# Patient Record
Sex: MALE | Race: WHITE | NOT HISPANIC OR LATINO | Employment: FULL TIME | URBAN - METROPOLITAN AREA
[De-identification: names, ages, dates, MRNs, and addresses within clinical notes are randomized per-mention and may not be internally consistent; named-entity substitution may affect disease eponyms.]

---

## 2018-07-19 ENCOUNTER — OFFICE VISIT CONVERTED (OUTPATIENT)
Dept: FAMILY MEDICINE CLINIC | Facility: CLINIC | Age: 58
End: 2018-07-19
Attending: FAMILY MEDICINE

## 2018-11-20 ENCOUNTER — OFFICE VISIT CONVERTED (OUTPATIENT)
Dept: FAMILY MEDICINE CLINIC | Facility: CLINIC | Age: 58
End: 2018-11-20
Attending: FAMILY MEDICINE

## 2018-11-29 ENCOUNTER — OFFICE VISIT CONVERTED (OUTPATIENT)
Dept: FAMILY MEDICINE CLINIC | Facility: CLINIC | Age: 58
End: 2018-11-29
Attending: FAMILY MEDICINE

## 2018-12-20 ENCOUNTER — OFFICE VISIT CONVERTED (OUTPATIENT)
Dept: FAMILY MEDICINE CLINIC | Facility: CLINIC | Age: 58
End: 2018-12-20
Attending: FAMILY MEDICINE

## 2019-01-21 ENCOUNTER — OFFICE VISIT CONVERTED (OUTPATIENT)
Dept: FAMILY MEDICINE CLINIC | Facility: CLINIC | Age: 59
End: 2019-01-21
Attending: FAMILY MEDICINE

## 2019-03-14 ENCOUNTER — HOSPITAL ENCOUNTER (OUTPATIENT)
Dept: SLEEP MEDICINE | Facility: HOSPITAL | Age: 59
Discharge: HOME | End: 2019-03-14
Attending: INTERNAL MEDICINE

## 2019-03-20 ENCOUNTER — HOSPITAL ENCOUNTER (OUTPATIENT)
Dept: SLEEP MEDICINE | Facility: HOSPITAL | Age: 59
Discharge: HOME | End: 2019-03-20
Attending: INTERNAL MEDICINE

## 2019-06-06 ENCOUNTER — HOSPITAL ENCOUNTER (OUTPATIENT)
Dept: SLEEP MEDICINE | Facility: HOSPITAL | Age: 59
Discharge: HOME | End: 2019-06-06
Attending: INTERNAL MEDICINE

## 2019-06-25 ENCOUNTER — CONVERSION ENCOUNTER (OUTPATIENT)
Dept: GASTROENTEROLOGY | Facility: CLINIC | Age: 59
End: 2019-06-25
Attending: INTERNAL MEDICINE

## 2019-08-09 ENCOUNTER — HOSPITAL ENCOUNTER (OUTPATIENT)
Dept: GASTROENTEROLOGY | Facility: HOSPITAL | Age: 59
Setting detail: HOSPITAL OUTPATIENT SURGERY
Discharge: HOME | End: 2019-08-09
Attending: INTERNAL MEDICINE

## 2019-08-29 ENCOUNTER — HOSPITAL ENCOUNTER (OUTPATIENT)
Dept: OTHER | Facility: HOSPITAL | Age: 59
Discharge: HOME | End: 2019-08-29

## 2019-08-29 LAB
ALBUMIN SERPL-MCNC: 4.7 G/DL (ref 3.5–5)
ALBUMIN/GLOB SERPL: 2.1 {RATIO} (ref 1.4–2.6)
ALP SERPL-CCNC: 48 U/L (ref 56–155)
ALT SERPL-CCNC: 32 U/L (ref 10–40)
ANION GAP SERPL CALC-SCNC: 19 MMOL/L (ref 8–19)
AST SERPL-CCNC: 31 U/L (ref 15–50)
BASOPHILS # BLD AUTO: 0.04 10*3/UL (ref 0–0.2)
BASOPHILS NFR BLD AUTO: 0.8 % (ref 0–3)
BILIRUB SERPL-MCNC: 0.47 MG/DL (ref 0.2–1.3)
BUN SERPL-MCNC: 17 MG/DL (ref 5–25)
BUN/CREAT SERPL: 14 {RATIO} (ref 6–20)
CALCIUM SERPL-MCNC: 9.8 MG/DL (ref 8.7–10.4)
CHLORIDE SERPL-SCNC: 107 MMOL/L (ref 99–111)
CHOLEST SERPL-MCNC: 211 MG/DL (ref 107–200)
CHOLEST/HDLC SERPL: 4 {RATIO} (ref 3–6)
CONV ABS IMM GRAN: 0.01 10*3/UL (ref 0–0.2)
CONV CO2: 25 MMOL/L (ref 22–32)
CONV IMMATURE GRAN: 0.2 % (ref 0–1.8)
CONV TOTAL PROTEIN: 6.9 G/DL (ref 6.3–8.2)
CREAT UR-MCNC: 1.23 MG/DL (ref 0.7–1.2)
DEPRECATED RDW RBC AUTO: 42.5 FL (ref 35.1–43.9)
EOSINOPHIL # BLD AUTO: 0.09 10*3/UL (ref 0–0.7)
EOSINOPHIL # BLD AUTO: 1.9 % (ref 0–7)
ERYTHROCYTE [DISTWIDTH] IN BLOOD BY AUTOMATED COUNT: 13.1 % (ref 11.6–14.4)
GFR SERPLBLD BASED ON 1.73 SQ M-ARVRAT: >60 ML/MIN/{1.73_M2}
GLOBULIN UR ELPH-MCNC: 2.2 G/DL (ref 2–3.5)
GLUCOSE SERPL-MCNC: 90 MG/DL (ref 70–99)
HCT VFR BLD AUTO: 40 % (ref 42–52)
HDLC SERPL-MCNC: 53 MG/DL (ref 40–60)
HGB BLD-MCNC: 13.4 G/DL (ref 14–18)
LDLC SERPL CALC-MCNC: 129 MG/DL (ref 70–100)
LYMPHOCYTES # BLD AUTO: 1.59 10*3/UL (ref 1–5)
LYMPHOCYTES NFR BLD AUTO: 33.2 % (ref 20–45)
MCH RBC QN AUTO: 29.5 PG (ref 27–31)
MCHC RBC AUTO-ENTMCNC: 33.5 G/DL (ref 33–37)
MCV RBC AUTO: 87.9 FL (ref 80–96)
MONOCYTES # BLD AUTO: 0.4 10*3/UL (ref 0.2–1.2)
MONOCYTES NFR BLD AUTO: 8.4 % (ref 3–10)
NEUTROPHILS # BLD AUTO: 2.66 10*3/UL (ref 2–8)
NEUTROPHILS NFR BLD AUTO: 55.5 % (ref 30–85)
NRBC CBCN: 0 % (ref 0–0.7)
OSMOLALITY SERPL CALC.SUM OF ELEC: 303 MOSM/KG (ref 273–304)
PLATELET # BLD AUTO: 188 10*3/UL (ref 130–400)
PMV BLD AUTO: 8.8 FL (ref 9.4–12.4)
POTASSIUM SERPL-SCNC: 5.3 MMOL/L (ref 3.5–5.3)
RBC # BLD AUTO: 4.55 10*6/UL (ref 4.7–6.1)
SODIUM SERPL-SCNC: 146 MMOL/L (ref 135–147)
TRIGL SERPL-MCNC: 144 MG/DL (ref 40–150)
TSH SERPL-ACNC: 1.61 M[IU]/L (ref 0.27–4.2)
VLDLC SERPL-MCNC: 29 MG/DL (ref 5–37)
WBC # BLD AUTO: 4.79 10*3/UL (ref 4.8–10.8)

## 2019-09-26 ENCOUNTER — HOSPITAL ENCOUNTER (OUTPATIENT)
Dept: SLEEP MEDICINE | Facility: HOSPITAL | Age: 59
Discharge: HOME | End: 2019-09-26
Attending: INTERNAL MEDICINE

## 2019-11-25 ENCOUNTER — OFFICE VISIT CONVERTED (OUTPATIENT)
Dept: FAMILY MEDICINE CLINIC | Facility: CLINIC | Age: 59
End: 2019-11-25
Attending: FAMILY MEDICINE

## 2020-07-30 ENCOUNTER — HOSPITAL ENCOUNTER (OUTPATIENT)
Dept: SLEEP MEDICINE | Facility: HOSPITAL | Age: 60
Discharge: HOME | End: 2020-07-30
Attending: INTERNAL MEDICINE

## 2020-08-19 ENCOUNTER — HOSPITAL ENCOUNTER (OUTPATIENT)
Dept: OTHER | Facility: HOSPITAL | Age: 60
Discharge: HOME | End: 2020-08-19

## 2020-08-19 LAB
ALBUMIN SERPL-MCNC: 4.5 G/DL (ref 3.5–5)
ALBUMIN/GLOB SERPL: 1.9 {RATIO} (ref 1.4–2.6)
ALP SERPL-CCNC: 49 U/L (ref 56–155)
ALT SERPL-CCNC: 36 U/L (ref 10–40)
ANION GAP SERPL CALC-SCNC: 18 MMOL/L (ref 8–19)
AST SERPL-CCNC: 29 U/L (ref 15–50)
BASOPHILS # BLD AUTO: 0.04 10*3/UL (ref 0–0.2)
BASOPHILS NFR BLD AUTO: 0.7 % (ref 0–3)
BILIRUB SERPL-MCNC: 0.84 MG/DL (ref 0.2–1.3)
BUN SERPL-MCNC: 22 MG/DL (ref 5–25)
BUN/CREAT SERPL: 17 {RATIO} (ref 6–20)
CALCIUM SERPL-MCNC: 9.9 MG/DL (ref 8.7–10.4)
CHLORIDE SERPL-SCNC: 99 MMOL/L (ref 99–111)
CHOLEST SERPL-MCNC: 228 MG/DL (ref 107–200)
CHOLEST/HDLC SERPL: 5.1 {RATIO} (ref 3–6)
CONV ABS IMM GRAN: 0.01 10*3/UL (ref 0–0.2)
CONV CO2: 26 MMOL/L (ref 22–32)
CONV IMMATURE GRAN: 0.2 % (ref 0–1.8)
CONV TOTAL PROTEIN: 6.9 G/DL (ref 6.3–8.2)
CREAT UR-MCNC: 1.31 MG/DL (ref 0.7–1.2)
DEPRECATED RDW RBC AUTO: 38.9 FL (ref 35.1–43.9)
EOSINOPHIL # BLD AUTO: 0.09 10*3/UL (ref 0–0.7)
EOSINOPHIL # BLD AUTO: 1.6 % (ref 0–7)
ERYTHROCYTE [DISTWIDTH] IN BLOOD BY AUTOMATED COUNT: 12.3 % (ref 11.6–14.4)
GFR SERPLBLD BASED ON 1.73 SQ M-ARVRAT: 58 ML/MIN/{1.73_M2}
GLOBULIN UR ELPH-MCNC: 2.4 G/DL (ref 2–3.5)
GLUCOSE SERPL-MCNC: 94 MG/DL (ref 70–99)
HCT VFR BLD AUTO: 41.1 % (ref 42–52)
HDLC SERPL-MCNC: 45 MG/DL (ref 40–60)
HGB BLD-MCNC: 13.9 G/DL (ref 14–18)
LDLC SERPL CALC-MCNC: 138 MG/DL (ref 70–100)
LYMPHOCYTES # BLD AUTO: 1.86 10*3/UL (ref 1–5)
LYMPHOCYTES NFR BLD AUTO: 33.8 % (ref 20–45)
MCH RBC QN AUTO: 29.3 PG (ref 27–31)
MCHC RBC AUTO-ENTMCNC: 33.8 G/DL (ref 33–37)
MCV RBC AUTO: 86.5 FL (ref 80–96)
MONOCYTES # BLD AUTO: 0.47 10*3/UL (ref 0.2–1.2)
MONOCYTES NFR BLD AUTO: 8.5 % (ref 3–10)
NEUTROPHILS # BLD AUTO: 3.03 10*3/UL (ref 2–8)
NEUTROPHILS NFR BLD AUTO: 55.2 % (ref 30–85)
NRBC CBCN: 0 % (ref 0–0.7)
OSMOLALITY SERPL CALC.SUM OF ELEC: 291 MOSM/KG (ref 273–304)
PLATELET # BLD AUTO: 178 10*3/UL (ref 130–400)
PMV BLD AUTO: 9.1 FL (ref 9.4–12.4)
POTASSIUM SERPL-SCNC: 3.9 MMOL/L (ref 3.5–5.3)
RBC # BLD AUTO: 4.75 10*6/UL (ref 4.7–6.1)
SODIUM SERPL-SCNC: 139 MMOL/L (ref 135–147)
TRIGL SERPL-MCNC: 224 MG/DL (ref 40–150)
TSH SERPL-ACNC: 1.57 M[IU]/L (ref 0.27–4.2)
VLDLC SERPL-MCNC: 45 MG/DL (ref 5–37)
WBC # BLD AUTO: 5.5 10*3/UL (ref 4.8–10.8)

## 2020-12-17 ENCOUNTER — TELEMEDICINE CONVERTED (OUTPATIENT)
Dept: FAMILY MEDICINE CLINIC | Facility: CLINIC | Age: 60
End: 2020-12-17
Attending: FAMILY MEDICINE

## 2020-12-23 ENCOUNTER — HOSPITAL ENCOUNTER (OUTPATIENT)
Dept: OTHER | Facility: HOSPITAL | Age: 60
Discharge: HOME | End: 2020-12-23
Attending: INTERNAL MEDICINE

## 2021-01-16 ENCOUNTER — HOSPITAL ENCOUNTER (OUTPATIENT)
Dept: OTHER | Facility: HOSPITAL | Age: 61
Discharge: HOME | End: 2021-01-16
Attending: INTERNAL MEDICINE

## 2021-05-14 NOTE — PROGRESS NOTES
Progress Note      Patient Name: Babatunde Barlow   Patient ID: [id number]   Sex: Male   YOB: 1960    Primary Care Provider: Fam Gutierrez DO   Referring Provider: Fam Gutierrez DO    Visit Date: December 17, 2020    Provider: Fam Gutierrez DO   Location: [facility]   Location Address: [address] [place] [address]  [id number]   Location Phone: [phone]          Chief Complaint  · Follow up - HLD, HTN, Snoring  · medication refill ( 90 day supply )       History Of Present Illness  Video Conferencing Visit  Babatunde Barlow is a 60 year old /White male who is presenting for evaluation via video conferencing via ZOOM. Verbal consent obtained before beginning visit.   The following staff were present during this visit: Sil Pryor CMA   Babatunde Barlow is a 60 year old /White male who presents for evaluation and treatment of: HTN and HLD. He states that he has been checking his BP and it has been good. He had annual employee labs in August. These were good, except for mildly elevated Cr.      He has been Dx with VERNA and wears a C-PAP nightly       Past Medical History  Disease Name Date Onset Notes   Edema --  --    Hyperlipidemia --  --    Hypertension --  --    Lumbar radiculopathy 11/29/2018 --    Seborrheic keratoses 11/25/2019 --          Past Surgical History  Procedure Name Date Notes   Colonoscopy 2018 2019 Ana, History of Colon Polyps   shoulder repair --  --          Medication List  Name Date Started Instructions   hydrochlorothiazide 25 mg oral tablet 09/05/2020 TAKE 1 TABLET BY MOUTH DAILY   pravastatin 40 mg oral tablet 01/06/2020 TAKE 1 TABLET BY MOUTH EVERY NIGHT AT BEDTIME         Allergy List  Allergen Name Date Reaction Notes   NO KNOWN DRUG ALLERGIES --  --  --          Family Medical History  Disease Name Relative/Age Notes   Stroke  --    Hypertension  --    Cancer, Unspecified  --    - No  "Family History of Colorectal Cancer  --    Diabetes  --          Social History  Finding Status Start/Stop Quantity Notes   Alcohol Current every day --/-- --  01/21/2019 - 11/29/2018 - 11/20/2018 -    Tobacco Never --/-- --  never         Immunizations  NameDate Admin Mfg Trade Name Lot Number Route Inj VIS Given VIS Publication   Xlsniofet91/01/2020 SKB Fluarix, quadrivalent, preservative free [id number] NE NE 11/01/2020    Comments: [facility]         Review of Systems  · Constitutional  o Denies  o : fatigue  · Eyes  o Denies  o : changes in vision  · HENT  o Denies  o : headaches  · Cardiovascular  o Denies  o : chest pain, irregular heart beats, rapid heart rate  · Respiratory  o Denies  o : shortness of breath, wheezing, cough, dyspnea on exertion      Vitals  Date Time BP Position Site L\R Cuff Size HR RR TEMP (F) WT  HT  BMI kg/m2 BSA m2 O2 Sat FR L/min FiO2 HC       01/21/2019 08:45 /81 Sitting    55 - R  97.7 209lbs 8oz 5'  11\" 29.22 2.18 100 %      11/25/2019 09:32 /79 Sitting    56 - R   214lbs 16oz 5'  11\" 29.99 2.21 99 %  21%          Physical Examination  · Constitutional  o Appearance  o : well-nourished, well developed, no obvious deformities present  · Head and Face  o Head  o :   § Inspection  § : atraumatic, normocephalic  o Face  o :   § Inspection  § : no facial lesions  · Respiratory  o Respiratory Effort  o : breathing unlabored, no accessory muscle use  · Neurologic  o Mental Status Examination  o :   § Orientation  § : grossly oriented to person, place and time  § Speech/Language  § : speech development normal for age, level of language comprehension normal for age  · Psychiatric  o Judgement and Insight  o : judgement and insight intact, judgement for everyday activities and social situations within normal limits  o Thought Processes  o : rate of thoughts normal, thought content logical, abstract reasoning within normal limits  o Mood and Affect  o : mood normal, affect " appropriate          Assessment  · Hyperlipidemia     272.4/E78.5  stable  · Hypertension     401.9/I10  He is doing well Will switch back to Lisinopril His renal function was mildly elevated, probably due to fasting. Will recheck   · VERNA on CPAP       Obstructive sleep apnea (adult) (pediatric)     327.23/G47.33  Dependence on other enabling machines and devices     327.23/Z99.89  f/u with Sleep Clinic      Plan  · Orders  o BMP [facility] ([id number]) - 401.9/I10 - 12/17/2020  o ACO-39: Current medications updated and reviewed (, 1159F) - - 12/17/2020  o ACO-14: Influenza immunization administered or previously received [facility] () - - 12/17/2020  · Medications  o lisinopril 40 mg oral tablet   SIG: take 1 tablet (40 mg) by oral route once daily for 90 days   DISP: (90) Tablet with 3 refills  Prescribed on 12/17/2020     o pravastatin 40 mg oral tablet   SIG: take 1 tablet (40 mg) by oral route once daily for 90 days   DISP: (90) Tablet with 3 refills  Adjusted on 12/17/2020     o hydrochlorothiazide 25 mg oral tablet   SIG: TAKE 1 TABLET BY MOUTH DAILY   DISP: (90) Tablet with -1 refills  Discontinued on 12/17/2020     o Medications have been Reconciled  o Transition of Care or Provider Policy  · Instructions  o Patient was educated and given low cholesterol diet information.  o Recommended exercise program to assist with cholesterol, weight loss and overall health improvement.  o Advised that cheeses and other sources of dairy fats, animal fats, fast food, and the extras (candy, pastries, pies, doughnuts and cookies) all contain LDL raising nutrients. Advised to increase fruits, vegetables, whole grains, and to monitor portion sizes.   o Patient is taking medications as prescribed and doing well.   o Take all medications as prescribed/directed.  o Patient instructed/educated on their diet and exercise program.  o Patient was educated/instructed on their diagnosis, treatment and medications prior to discharge from the  clinic today.  o Patient instructed to seek medical attention urgently for new or worsening symptoms.  o Call the office with any concerns or questions.  o Bring all medicines with their bottles to each office visit.  · Disposition  o Call or Return if symptoms worsen or persist.  o Return Visit Request in/on 1 year +/- 2 days ([id number]).            Electronically Signed by: Fam Gutierrez, DO -Author on December 17, 2020 08:57:14 AM

## 2021-05-15 VITALS
HEIGHT: 71 IN | HEART RATE: 58 BPM | SYSTOLIC BLOOD PRESSURE: 127 MMHG | DIASTOLIC BLOOD PRESSURE: 79 MMHG | BODY MASS INDEX: 30.1 KG/M2 | WEIGHT: 215 LBS | OXYGEN SATURATION: 99 %

## 2021-05-15 VITALS
HEART RATE: 70 BPM | WEIGHT: 209.25 LBS | TEMPERATURE: 98.2 F | OXYGEN SATURATION: 98 % | BODY MASS INDEX: 29.29 KG/M2 | HEIGHT: 71 IN | RESPIRATION RATE: 12 BRPM | DIASTOLIC BLOOD PRESSURE: 73 MMHG | SYSTOLIC BLOOD PRESSURE: 108 MMHG

## 2021-05-15 VITALS
HEART RATE: 57 BPM | HEIGHT: 71 IN | WEIGHT: 209.5 LBS | SYSTOLIC BLOOD PRESSURE: 122 MMHG | BODY MASS INDEX: 29.33 KG/M2 | OXYGEN SATURATION: 100 % | TEMPERATURE: 97.7 F | DIASTOLIC BLOOD PRESSURE: 81 MMHG

## 2021-05-16 VITALS
BODY MASS INDEX: 29.56 KG/M2 | TEMPERATURE: 98.1 F | HEIGHT: 71 IN | OXYGEN SATURATION: 99 % | DIASTOLIC BLOOD PRESSURE: 75 MMHG | HEART RATE: 61 BPM | SYSTOLIC BLOOD PRESSURE: 151 MMHG | WEIGHT: 211.12 LBS

## 2021-05-16 VITALS
HEIGHT: 71 IN | WEIGHT: 223 LBS | SYSTOLIC BLOOD PRESSURE: 140 MMHG | HEART RATE: 45 BPM | DIASTOLIC BLOOD PRESSURE: 62 MMHG | BODY MASS INDEX: 31.22 KG/M2

## 2021-05-16 VITALS
HEIGHT: 71 IN | OXYGEN SATURATION: 98 % | BODY MASS INDEX: 29.33 KG/M2 | WEIGHT: 209.5 LBS | TEMPERATURE: 98.4 F | DIASTOLIC BLOOD PRESSURE: 76 MMHG | SYSTOLIC BLOOD PRESSURE: 143 MMHG | HEART RATE: 74 BPM

## 2021-11-22 ENCOUNTER — IMMUNIZATION (OUTPATIENT)
Dept: VACCINE CLINIC | Facility: HOSPITAL | Age: 61
End: 2021-11-22

## 2021-11-22 DIAGNOSIS — Z23 NEED FOR VACCINATION: Primary | ICD-10-CM

## 2021-12-20 ENCOUNTER — OFFICE VISIT (OUTPATIENT)
Dept: FAMILY MEDICINE CLINIC | Facility: CLINIC | Age: 61
End: 2021-12-20

## 2021-12-20 VITALS
WEIGHT: 225.4 LBS | BODY MASS INDEX: 31.56 KG/M2 | OXYGEN SATURATION: 100 % | TEMPERATURE: 97.3 F | HEART RATE: 80 BPM | SYSTOLIC BLOOD PRESSURE: 160 MMHG | DIASTOLIC BLOOD PRESSURE: 80 MMHG | HEIGHT: 71 IN

## 2021-12-20 DIAGNOSIS — G47.33 OSA ON CPAP: Primary | ICD-10-CM

## 2021-12-20 DIAGNOSIS — Z99.89 OSA ON CPAP: Primary | ICD-10-CM

## 2021-12-20 DIAGNOSIS — Z00.00 ANNUAL PHYSICAL EXAM: ICD-10-CM

## 2021-12-20 DIAGNOSIS — I10 PRIMARY HYPERTENSION: ICD-10-CM

## 2021-12-20 DIAGNOSIS — E78.5 HYPERLIPIDEMIA, UNSPECIFIED HYPERLIPIDEMIA TYPE: ICD-10-CM

## 2021-12-20 LAB
ALBUMIN SERPL-MCNC: 4.4 G/DL (ref 3.5–5.2)
ALBUMIN/GLOB SERPL: 2 G/DL
ALP SERPL-CCNC: 54 U/L (ref 39–117)
ALT SERPL W P-5'-P-CCNC: 31 U/L (ref 1–41)
ANION GAP SERPL CALCULATED.3IONS-SCNC: 9.9 MMOL/L (ref 5–15)
AST SERPL-CCNC: 25 U/L (ref 1–40)
BILIRUB SERPL-MCNC: 0.5 MG/DL (ref 0–1.2)
BUN SERPL-MCNC: 21 MG/DL (ref 8–23)
BUN/CREAT SERPL: 17.9 (ref 7–25)
CALCIUM SPEC-SCNC: 9.2 MG/DL (ref 8.6–10.5)
CHLORIDE SERPL-SCNC: 109 MMOL/L (ref 98–107)
CHOLEST SERPL-MCNC: 196 MG/DL (ref 0–200)
CO2 SERPL-SCNC: 26.1 MMOL/L (ref 22–29)
CREAT SERPL-MCNC: 1.17 MG/DL (ref 0.76–1.27)
GFR SERPL CREATININE-BSD FRML MDRD: 63 ML/MIN/1.73
GLOBULIN UR ELPH-MCNC: 2.2 GM/DL
GLUCOSE SERPL-MCNC: 99 MG/DL (ref 65–99)
HDLC SERPL-MCNC: 59 MG/DL (ref 40–60)
LDLC SERPL CALC-MCNC: 109 MG/DL (ref 0–100)
LDLC/HDLC SERPL: 1.78 {RATIO}
POTASSIUM SERPL-SCNC: 4.1 MMOL/L (ref 3.5–5.2)
PROT SERPL-MCNC: 6.6 G/DL (ref 6–8.5)
SODIUM SERPL-SCNC: 145 MMOL/L (ref 136–145)
TRIGL SERPL-MCNC: 159 MG/DL (ref 0–150)
VLDLC SERPL-MCNC: 28 MG/DL (ref 5–40)

## 2021-12-20 PROCEDURE — 80061 LIPID PANEL: CPT | Performed by: FAMILY MEDICINE

## 2021-12-20 PROCEDURE — 36415 COLL VENOUS BLD VENIPUNCTURE: CPT | Performed by: FAMILY MEDICINE

## 2021-12-20 PROCEDURE — 99396 PREV VISIT EST AGE 40-64: CPT | Performed by: FAMILY MEDICINE

## 2021-12-20 PROCEDURE — 80053 COMPREHEN METABOLIC PANEL: CPT | Performed by: FAMILY MEDICINE

## 2021-12-20 RX ORDER — HYDROCHLOROTHIAZIDE 25 MG/1
25 TABLET ORAL DAILY
Qty: 90 TABLET | Refills: 3 | Status: SHIPPED | OUTPATIENT
Start: 2021-12-20 | End: 2022-12-20 | Stop reason: SDUPTHER

## 2021-12-20 RX ORDER — PRAVASTATIN SODIUM 40 MG
40 TABLET ORAL DAILY
Qty: 90 TABLET | Refills: 3 | Status: SHIPPED | OUTPATIENT
Start: 2021-12-20 | End: 2022-12-20 | Stop reason: SDUPTHER

## 2021-12-20 RX ORDER — LISINOPRIL 40 MG/1
40 TABLET ORAL DAILY
Qty: 90 TABLET | Refills: 3 | Status: SHIPPED | OUTPATIENT
Start: 2021-12-20 | End: 2022-12-20 | Stop reason: SDUPTHER

## 2021-12-20 NOTE — PROGRESS NOTES
Chief Complaint   Patient presents with   • Annual Exam     no concerns at this time        Subjective     Babatunde Barlow  has a past medical history of Edema, Hyperlipidemia, Hypertension, Lumbar radiculopathy (11/29/2018), VERNA on CPAP (12/17/2020), and Seborrheic keratoses (11/25/2019).    Annual exam-he states otherwise he is doing well.  He has no other additional specific concerns.    Hypertension-he states he has not been checking his blood pressure outside the office.  He is taking his medication.    Hyperlipidemia-he takes his pravastatin on a nightly basis.    VERNA-he states he wears his CPAP on a nightly basis.      PHQ-2 Depression Screening  Little interest or pleasure in doing things? 0   Feeling down, depressed, or hopeless? 0   PHQ-2 Total Score 0   PHQ-9 Depression Screening  Little interest or pleasure in doing things? 0   Feeling down, depressed, or hopeless? 0   Trouble falling or staying asleep, or sleeping too much?     Feeling tired or having little energy?     Poor appetite or overeating?     Feeling bad about yourself - or that you are a failure or have let yourself or your family down?     Trouble concentrating on things, such as reading the newspaper or watching television?     Moving or speaking so slowly that other people could have noticed? Or the opposite - being so fidgety or restless that you have been moving around a lot more than usual?     Thoughts that you would be better off dead, or of hurting yourself in some way?     PHQ-9 Total Score 0   If you checked off any problems, how difficult have these problems made it for you to do your work, take care of things at home, or get along with other people?       No Known Allergies    Prior to Admission medications    Medication Sig Start Date End Date Taking? Authorizing Provider   lisinopril (PRINIVIL,ZESTRIL) 40 MG tablet Take 1 tablet by mouth Daily. 12/17/20  Yes Fam Gutierrez, DO   pravastatin (PRAVACHOL) 40 MG tablet  Take 1 tablet by mouth Daily. 12/17/20  Yes Fam Gutierrez,    hydrochlorothiazide (HYDRODIURIL) 25 MG tablet Take 25 mg by mouth Daily. 11/13/18 12/20/21  Emergency, Nurse Lexii, RN   HYDROcodone-acetaminophen (NORCO) 5-325 MG per tablet Take 1 tablet by mouth Every 6 (Six) Hours As Needed for Moderate Pain  or Severe Pain . 11/23/18 12/20/21  Gideon Meraz MD   lisinopril (PRINIVIL,ZESTRIL) 40 MG tablet  11/9/18 12/20/21  Emergency, Nurse Lexii, RN   pravastatin (PRAVACHOL) 40 MG tablet  11/9/18 12/20/21  Emergency, Nurse Epic, RN        Patient Active Problem List   Diagnosis   • Hyperlipidemia   • Hypertension   • VERNA on CPAP   • Annual physical exam        Past Surgical History:   Procedure Laterality Date   • COLONOSCOPY      2018,2019;SUSY,HISTORY OF COLON POLYPS   • SHOULDER SURGERY      SHOULDER REPAIR       Social History     Socioeconomic History   • Marital status:    Tobacco Use   • Smoking status: Never Smoker   • Smokeless tobacco: Never Used   Vaping Use   • Vaping Use: Never used   Substance and Sexual Activity   • Alcohol use: Defer       Family History   Problem Relation Age of Onset   • Stroke Other    • Hypertension Other    • Cancer Other         UNSPECIFIED   • Colon cancer Other         NO FAMILY HISTORY OF COLORECTAL CANCER   • Diabetes Other        Family history, surgical history, past medical history, Allergies and med's reviewed with patient today and updated in [facility] EMR.     ROS:  Review of Systems   Constitutional: Negative for fatigue.   HENT: Negative for congestion, postnasal drip and rhinorrhea.    Eyes: Negative for blurred vision and visual disturbance.   Respiratory: Negative for cough, chest tightness, shortness of breath and wheezing.    Cardiovascular: Negative for chest pain and palpitations.   Gastrointestinal: Negative for abdominal pain, constipation and diarrhea.   Neurological: Negative for headache.   Hematological: Negative for adenopathy.  "  Psychiatric/Behavioral: Negative for depressed mood. The patient is not nervous/anxious.        OBJECTIVE:  Vitals:    12/20/21 0824   BP: 160/80   BP Location: Left arm   Patient Position: Sitting   Cuff Size: Large Adult   Pulse: 80   Temp: 97.3 °F (36.3 °C)   TempSrc: Temporal   SpO2: 100%   Weight: 102 kg (225 lb 6.4 oz)   Height: 180.3 cm (71\")     No exam data present   Body mass index is 31.44 kg/m².  No LMP for male patient.    Physical Exam  Vitals and nursing note reviewed.   Constitutional:       General: He is not in acute distress.     Appearance: Normal appearance. He is obese.   HENT:      Head: Normocephalic.      Right Ear: Tympanic membrane, ear canal and external ear normal.      Left Ear: Tympanic membrane, ear canal and external ear normal.      Nose: Nose normal.      Mouth/Throat:      Mouth: Mucous membranes are moist.      Pharynx: Oropharynx is clear.   Eyes:      General: No scleral icterus.     Conjunctiva/sclera: Conjunctivae normal.      Pupils: Pupils are equal, round, and reactive to light.   Cardiovascular:      Rate and Rhythm: Normal rate and regular rhythm.      Pulses: Normal pulses.      Heart sounds: Normal heart sounds. No murmur heard.      Pulmonary:      Effort: Pulmonary effort is normal.      Breath sounds: Normal breath sounds. No wheezing, rhonchi or rales.   Abdominal:      General: Abdomen is flat. Bowel sounds are normal.      Palpations: Abdomen is soft. There is no mass.      Tenderness: There is no abdominal tenderness. There is no right CVA tenderness.   Musculoskeletal:      Cervical back: No rigidity or tenderness.   Lymphadenopathy:      Cervical: No cervical adenopathy.   Skin:     General: Skin is warm and dry.      Coloration: Skin is not jaundiced.      Findings: No rash.   Neurological:      General: No focal deficit present.      Mental Status: He is alert and oriented to person, place, and time.      Gait: Gait normal.   Psychiatric:         Mood and " Affect: Mood normal.         Thought Content: Thought content normal.         Judgment: Judgment normal.         Procedures    No visits with results within 30 Day(s) from this visit.   Latest known visit with results is:   No results found for any previous visit.       ASSESSMENT/ PLAN:    Diagnoses and all orders for this visit:    1. VERNA on CPAP (Primary)  Assessment & Plan:  He wears his CPAP on a regular basis.      2. Primary hypertension  Assessment & Plan:  His blood pressures up a little bit here today we will add some hydrochlorothiazide back to his regiment.  In the meantime he will keep working on some lifestyle changes    Orders:  -     Comprehensive Metabolic Panel  -     Lipid Panel    3. Hyperlipidemia, unspecified hyperlipidemia type  Assessment & Plan:  We will update his lipid profile with today's labs.    Orders:  -     Comprehensive Metabolic Panel  -     Lipid Panel    4. Annual physical exam  Assessment & Plan:  Overall he is doing well.  He has gained some weight over the past year and thus his blood pressure is up.      Other orders  -     hydroCHLOROthiazide (HYDRODIURIL) 25 MG tablet; Take 1 tablet by mouth Daily for 90 days.  Dispense: 90 tablet; Refill: 3  -     pravastatin (PRAVACHOL) 40 MG tablet; Take 1 tablet by mouth Daily.  Dispense: 90 tablet; Refill: 3  -     lisinopril (PRINIVIL,ZESTRIL) 40 MG tablet; Take 1 tablet by mouth Daily.  Dispense: 90 tablet; Refill: 3      Orders Placed Today:     New Medications Ordered This Visit   Medications   • hydroCHLOROthiazide (HYDRODIURIL) 25 MG tablet     Sig: Take 1 tablet by mouth Daily for 90 days.     Dispense:  90 tablet     Refill:  3   • pravastatin (PRAVACHOL) 40 MG tablet     Sig: Take 1 tablet by mouth Daily.     Dispense:  90 tablet     Refill:  3   • lisinopril (PRINIVIL,ZESTRIL) 40 MG tablet     Sig: Take 1 tablet by mouth Daily.     Dispense:  90 tablet     Refill:  3        Management Plan:     An After Visit Summary was  printed and given to the patient at discharge.    Follow-up: No follow-ups on file.    Fam Gutierrez DO 12/20/2021 08:42 EST  This note was electronically signed.

## 2021-12-20 NOTE — ASSESSMENT & PLAN NOTE
His blood pressures up a little bit here today we will add some hydrochlorothiazide back to his regiment.  In the meantime he will keep working on some lifestyle changes

## 2021-12-20 NOTE — ASSESSMENT & PLAN NOTE
Overall he is doing well.  He has gained some weight over the past year and thus his blood pressure is up.

## 2022-12-20 ENCOUNTER — OFFICE VISIT (OUTPATIENT)
Dept: FAMILY MEDICINE CLINIC | Facility: CLINIC | Age: 62
End: 2022-12-20

## 2022-12-20 VITALS
OXYGEN SATURATION: 100 % | SYSTOLIC BLOOD PRESSURE: 137 MMHG | DIASTOLIC BLOOD PRESSURE: 80 MMHG | HEART RATE: 69 BPM | BODY MASS INDEX: 28.7 KG/M2 | TEMPERATURE: 97 F | HEIGHT: 71 IN | WEIGHT: 205 LBS

## 2022-12-20 DIAGNOSIS — Z00.00 ANNUAL PHYSICAL EXAM: ICD-10-CM

## 2022-12-20 DIAGNOSIS — E78.5 HYPERLIPIDEMIA, UNSPECIFIED HYPERLIPIDEMIA TYPE: Primary | ICD-10-CM

## 2022-12-20 DIAGNOSIS — Z12.5 SCREENING FOR MALIGNANT NEOPLASM OF PROSTATE: ICD-10-CM

## 2022-12-20 DIAGNOSIS — I10 PRIMARY HYPERTENSION: ICD-10-CM

## 2022-12-20 PROBLEM — F51.01 PRIMARY INSOMNIA: Status: ACTIVE | Noted: 2022-12-20

## 2022-12-20 LAB
ALBUMIN SERPL-MCNC: 4.6 G/DL (ref 3.5–5.2)
ALBUMIN/GLOB SERPL: 2.4 G/DL
ALP SERPL-CCNC: 44 U/L (ref 39–117)
ALT SERPL W P-5'-P-CCNC: 22 U/L (ref 1–41)
ANION GAP SERPL CALCULATED.3IONS-SCNC: 8.1 MMOL/L (ref 5–15)
AST SERPL-CCNC: 27 U/L (ref 1–40)
BASOPHILS # BLD AUTO: 0.03 10*3/MM3 (ref 0–0.2)
BASOPHILS NFR BLD AUTO: 0.5 % (ref 0–1.5)
BILIRUB SERPL-MCNC: 0.5 MG/DL (ref 0–1.2)
BUN SERPL-MCNC: 20 MG/DL (ref 8–23)
BUN/CREAT SERPL: 15.7 (ref 7–25)
CALCIUM SPEC-SCNC: 9.4 MG/DL (ref 8.6–10.5)
CHLORIDE SERPL-SCNC: 106 MMOL/L (ref 98–107)
CHOLEST SERPL-MCNC: 181 MG/DL (ref 0–200)
CO2 SERPL-SCNC: 25.9 MMOL/L (ref 22–29)
CREAT SERPL-MCNC: 1.27 MG/DL (ref 0.76–1.27)
DEPRECATED RDW RBC AUTO: 41.2 FL (ref 37–54)
EGFRCR SERPLBLD CKD-EPI 2021: 63.1 ML/MIN/1.73
EOSINOPHIL # BLD AUTO: 0.06 10*3/MM3 (ref 0–0.4)
EOSINOPHIL NFR BLD AUTO: 1 % (ref 0.3–6.2)
ERYTHROCYTE [DISTWIDTH] IN BLOOD BY AUTOMATED COUNT: 13 % (ref 12.3–15.4)
GLOBULIN UR ELPH-MCNC: 1.9 GM/DL
GLUCOSE SERPL-MCNC: 100 MG/DL (ref 65–99)
HCT VFR BLD AUTO: 36.1 % (ref 37.5–51)
HDLC SERPL-MCNC: 50 MG/DL (ref 40–60)
HGB BLD-MCNC: 12.3 G/DL (ref 13–17.7)
IMM GRANULOCYTES # BLD AUTO: 0.02 10*3/MM3 (ref 0–0.05)
IMM GRANULOCYTES NFR BLD AUTO: 0.3 % (ref 0–0.5)
LDLC SERPL CALC-MCNC: 117 MG/DL (ref 0–100)
LDLC/HDLC SERPL: 2.32 {RATIO}
LYMPHOCYTES # BLD AUTO: 1.57 10*3/MM3 (ref 0.7–3.1)
LYMPHOCYTES NFR BLD AUTO: 27.2 % (ref 19.6–45.3)
MCH RBC QN AUTO: 29.5 PG (ref 26.6–33)
MCHC RBC AUTO-ENTMCNC: 34.1 G/DL (ref 31.5–35.7)
MCV RBC AUTO: 86.6 FL (ref 79–97)
MONOCYTES # BLD AUTO: 0.39 10*3/MM3 (ref 0.1–0.9)
MONOCYTES NFR BLD AUTO: 6.7 % (ref 5–12)
NEUTROPHILS NFR BLD AUTO: 3.71 10*3/MM3 (ref 1.7–7)
NEUTROPHILS NFR BLD AUTO: 64.3 % (ref 42.7–76)
NRBC BLD AUTO-RTO: 0 /100 WBC (ref 0–0.2)
PLATELET # BLD AUTO: 197 10*3/MM3 (ref 140–450)
PMV BLD AUTO: 10.3 FL (ref 6–12)
POTASSIUM SERPL-SCNC: 4.6 MMOL/L (ref 3.5–5.2)
PROT SERPL-MCNC: 6.5 G/DL (ref 6–8.5)
PSA SERPL-MCNC: 1.03 NG/ML (ref 0–4)
RBC # BLD AUTO: 4.17 10*6/MM3 (ref 4.14–5.8)
SODIUM SERPL-SCNC: 140 MMOL/L (ref 136–145)
TRIGL SERPL-MCNC: 75 MG/DL (ref 0–150)
TSH SERPL DL<=0.05 MIU/L-ACNC: 1.75 UIU/ML (ref 0.27–4.2)
VLDLC SERPL-MCNC: 14 MG/DL (ref 5–40)
WBC NRBC COR # BLD: 5.78 10*3/MM3 (ref 3.4–10.8)

## 2022-12-20 PROCEDURE — G0103 PSA SCREENING: HCPCS | Performed by: FAMILY MEDICINE

## 2022-12-20 PROCEDURE — 80050 GENERAL HEALTH PANEL: CPT | Performed by: FAMILY MEDICINE

## 2022-12-20 PROCEDURE — 36415 COLL VENOUS BLD VENIPUNCTURE: CPT | Performed by: FAMILY MEDICINE

## 2022-12-20 PROCEDURE — 99396 PREV VISIT EST AGE 40-64: CPT | Performed by: FAMILY MEDICINE

## 2022-12-20 PROCEDURE — 80061 LIPID PANEL: CPT | Performed by: FAMILY MEDICINE

## 2022-12-20 RX ORDER — TRAZODONE HYDROCHLORIDE 50 MG/1
50 TABLET ORAL NIGHTLY
Qty: 90 TABLET | Refills: 1 | Status: SHIPPED | OUTPATIENT
Start: 2022-12-20

## 2022-12-20 RX ORDER — PRAVASTATIN SODIUM 40 MG
40 TABLET ORAL DAILY
Qty: 90 TABLET | Refills: 3 | Status: SHIPPED | OUTPATIENT
Start: 2022-12-20

## 2022-12-20 RX ORDER — HYDROCHLOROTHIAZIDE 25 MG/1
25 TABLET ORAL DAILY
Qty: 90 TABLET | Refills: 3 | Status: SHIPPED | OUTPATIENT
Start: 2022-12-20 | End: 2023-06-18

## 2022-12-20 RX ORDER — LISINOPRIL 40 MG/1
40 TABLET ORAL DAILY
Qty: 90 TABLET | Refills: 3 | Status: SHIPPED | OUTPATIENT
Start: 2022-12-20

## 2022-12-20 NOTE — PROGRESS NOTES
Chief Complaint   Patient presents with   • Annual Exam        Subjective     Babatunde Barlow  has a past medical history of Edema, Hyperlipidemia, Hypertension, Lumbar radiculopathy (11/29/2018), VERNA on CPAP (12/17/2020), and Seborrheic keratoses (11/25/2019).    Annual exam- overall he states he is doing well except for some arthritic changes in his hands.    Hypertension- he does check his blood pressure at home.  He states that usually pretty good and lower than it is here in the office today.  His office blood pressure today was 137/80.    Hyperlipidemia- he is taking his pravastatin on a nightly basis.      PHQ-2 Depression Screening  Little interest or pleasure in doing things?     Feeling down, depressed, or hopeless?     PHQ-2 Total Score     PHQ-9 Depression Screening  Little interest or pleasure in doing things?     Feeling down, depressed, or hopeless?     Trouble falling or staying asleep, or sleeping too much?     Feeling tired or having little energy?     Poor appetite or overeating?     Feeling bad about yourself - or that you are a failure or have let yourself or your family down?     Trouble concentrating on things, such as reading the newspaper or watching television?     Moving or speaking so slowly that other people could have noticed? Or the opposite - being so fidgety or restless that you have been moving around a lot more than usual?     Thoughts that you would be better off dead, or of hurting yourself in some way?     PHQ-9 Total Score     If you checked off any problems, how difficult have these problems made it for you to do your work, take care of things at home, or get along with other people?       No Known Allergies    Prior to Admission medications    Medication Sig Start Date End Date Taking? Authorizing Provider   hydroCHLOROthiazide (HYDRODIURIL) 25 MG tablet Take 1 tablet by mouth Daily for 90 days. 12/20/21 12/20/22 Yes Fam Gutierrez, DO   lisinopril  (PRINIVIL,ZESTRIL) 40 MG tablet Take 1 tablet by mouth Daily. 12/20/21  Yes Fam Gutierrez DO   pravastatin (PRAVACHOL) 40 MG tablet Take 1 tablet by mouth Daily. 12/20/21  Yes Fam Gutierrez DO        Patient Active Problem List   Diagnosis   • Hyperlipidemia   • Hypertension   • VERNA on CPAP   • Annual physical exam   • Screening for malignant neoplasm of prostate   • Primary insomnia        Past Surgical History:   Procedure Laterality Date   • COLONOSCOPY      2018,2019;SUSY,HISTORY OF COLON POLYPS   • SHOULDER SURGERY      SHOULDER REPAIR       Social History     Socioeconomic History   • Marital status:    Tobacco Use   • Smoking status: Never   • Smokeless tobacco: Never   Vaping Use   • Vaping Use: Never used   Substance and Sexual Activity   • Alcohol use: Defer       Family History   Problem Relation Age of Onset   • Stroke Other    • Hypertension Other    • Cancer Other         UNSPECIFIED   • Colon cancer Other         NO FAMILY HISTORY OF COLORECTAL CANCER   • Diabetes Other        Family history, surgical history, past medical history, Allergies and meds reviewed with patient today and updated in [facility] EMR.     ROS:  Review of Systems   Constitutional: Negative for fatigue.   HENT: Negative for congestion, postnasal drip and rhinorrhea.    Eyes: Negative for blurred vision and visual disturbance.   Respiratory: Negative for cough, chest tightness, shortness of breath and wheezing.    Cardiovascular: Negative for chest pain and palpitations.   Gastrointestinal: Negative for abdominal pain, constipation and diarrhea.   Allergic/Immunologic: Negative for environmental allergies.   Neurological: Negative for headache.   Psychiatric/Behavioral: Negative for depressed mood. The patient is not nervous/anxious.        OBJECTIVE:  Vitals:    12/20/22 0750   BP: 137/80   BP Location: Left arm   Patient Position: Sitting   Pulse: 69   Temp: 97 °F (36.1 °C)   SpO2: 100%   Weight: 93 kg  "(205 lb)   Height: 180.3 cm (71\")     No results found.   Body mass index is 28.59 kg/m².  No LMP for male patient.    Physical Exam  Vitals and nursing note reviewed.   Constitutional:       General: He is not in acute distress.     Appearance: Normal appearance. He is normal weight.   HENT:      Head: Normocephalic.      Right Ear: Tympanic membrane, ear canal and external ear normal.      Left Ear: Tympanic membrane, ear canal and external ear normal.      Nose: Nose normal.      Mouth/Throat:      Mouth: Mucous membranes are moist.      Pharynx: Oropharynx is clear.   Eyes:      General: No scleral icterus.     Conjunctiva/sclera: Conjunctivae normal.      Pupils: Pupils are equal, round, and reactive to light.   Cardiovascular:      Rate and Rhythm: Normal rate and regular rhythm.      Pulses: Normal pulses.      Heart sounds: Normal heart sounds. No murmur heard.  Pulmonary:      Effort: Pulmonary effort is normal.      Breath sounds: Normal breath sounds. No wheezing, rhonchi or rales.   Musculoskeletal:      Cervical back: Neck supple. No rigidity or tenderness.   Lymphadenopathy:      Cervical: No cervical adenopathy.   Skin:     General: Skin is warm and dry.      Coloration: Skin is not jaundiced.      Findings: No rash.   Neurological:      General: No focal deficit present.      Mental Status: He is alert and oriented to person, place, and time.   Psychiatric:         Mood and Affect: Mood normal.         Thought Content: Thought content normal.         Judgment: Judgment normal.         Procedures    No visits with results within 30 Day(s) from this visit.   Latest known visit with results is:   Office Visit on 12/20/2021   Component Date Value Ref Range Status   • Glucose 12/20/2021 99  65 - 99 mg/dL Final   • BUN 12/20/2021 21  8 - 23 mg/dL Final   • Creatinine 12/20/2021 1.17  0.76 - 1.27 mg/dL Final   • Sodium 12/20/2021 145  136 - 145 mmol/L Final   • Potassium 12/20/2021 4.1  3.5 - 5.2 mmol/L " Final   • Chloride 12/20/2021 109 (H)  98 - 107 mmol/L Final   • CO2 12/20/2021 26.1  22.0 - 29.0 mmol/L Final   • Calcium 12/20/2021 9.2  8.6 - 10.5 mg/dL Final   • Total Protein 12/20/2021 6.6  6.0 - 8.5 g/dL Final   • Albumin 12/20/2021 4.40  3.50 - 5.20 g/dL Final   • ALT (SGPT) 12/20/2021 31  1 - 41 U/L Final   • AST (SGOT) 12/20/2021 25  1 - 40 U/L Final   • Alkaline Phosphatase 12/20/2021 54  39 - 117 U/L Final   • Total Bilirubin 12/20/2021 0.5  0.0 - 1.2 mg/dL Final   • eGFR Non  Amer 12/20/2021 63  >60 mL/min/1.73 Final   • Globulin 12/20/2021 2.2  gm/dL Final   • A/G Ratio 12/20/2021 2.0  g/dL Final   • BUN/Creatinine Ratio 12/20/2021 17.9  7.0 - 25.0 Final   • Anion Gap 12/20/2021 9.9  5.0 - 15.0 mmol/L Final   • Total Cholesterol 12/20/2021 196  0 - 200 mg/dL Final   • Triglycerides 12/20/2021 159 (H)  0 - 150 mg/dL Final   • HDL Cholesterol 12/20/2021 59  40 - 60 mg/dL Final   • LDL Cholesterol  12/20/2021 109 (H)  0 - 100 mg/dL Final   • VLDL Cholesterol 12/20/2021 28  5 - 40 mg/dL Final   • LDL/HDL Ratio 12/20/2021 1.78   Final       ASSESSMENT/ PLAN:    Diagnoses and all orders for this visit:    1. Hyperlipidemia, unspecified hyperlipidemia type (Primary)  Assessment & Plan:  Update his lipid profile with his labs here today.    Orders:  -     Comprehensive Metabolic Panel  -     TSH  -     Lipid Panel    2. Primary hypertension  Assessment & Plan:  His blood pressure is good here but even better outside the office.  We will continue his current meds and update his labs.    Orders:  -     Comprehensive Metabolic Panel  -     CBC & Differential  -     Lipid Panel    3. Annual physical exam  Assessment & Plan:  Overall he is doing well.  He is due for his repeat colonoscopy and he will schedule that himself after the first of the year.  I also encouraged him to stop at his local pharmacy and get his Shingrix vaccine.      4. Screening for malignant neoplasm of prostate  -     PSA  Screen    Other orders  -     traZODone (DESYREL) 50 MG tablet; Take 1 tablet by mouth Every Night.  Dispense: 90 tablet; Refill: 1  -     pravastatin (PRAVACHOL) 40 MG tablet; Take 1 tablet by mouth Daily.  Dispense: 90 tablet; Refill: 3  -     lisinopril (PRINIVIL,ZESTRIL) 40 MG tablet; Take 1 tablet by mouth Daily.  Dispense: 90 tablet; Refill: 3  -     hydroCHLOROthiazide (HYDRODIURIL) 25 MG tablet; Take 1 tablet by mouth Daily for 90 days.  Dispense: 90 tablet; Refill: 3      Orders Placed Today:     New Medications Ordered This Visit   Medications   • traZODone (DESYREL) 50 MG tablet     Sig: Take 1 tablet by mouth Every Night.     Dispense:  90 tablet     Refill:  1   • pravastatin (PRAVACHOL) 40 MG tablet     Sig: Take 1 tablet by mouth Daily.     Dispense:  90 tablet     Refill:  3   • lisinopril (PRINIVIL,ZESTRIL) 40 MG tablet     Sig: Take 1 tablet by mouth Daily.     Dispense:  90 tablet     Refill:  3   • hydroCHLOROthiazide (HYDRODIURIL) 25 MG tablet     Sig: Take 1 tablet by mouth Daily for 90 days.     Dispense:  90 tablet     Refill:  3        Management Plan:     An After Visit Summary was printed and given to the patient at discharge.    Follow-up: No follow-ups on file.    Fam Gutierrez DO 12/20/2022 08:17 EST  This note was electronically signed.  Answers for HPI/ROS submitted by the patient on 12/13/2022  What is the primary reason for your visit?: Physical

## 2022-12-20 NOTE — ASSESSMENT & PLAN NOTE
He has chronic sleep issues.  He has done all the sleep hygiene without any benefit.  He wears his CPAP on a nightly basis but it has not helped.  He states he has no difficulty initiating sleep and typically wakes up after about 4 hours and then has difficulty reinitiating sleep.  We will give him a trial some trazodone at bedtime.

## 2022-12-20 NOTE — ASSESSMENT & PLAN NOTE
His blood pressure is good here but even better outside the office.  We will continue his current meds and update his labs.

## 2022-12-20 NOTE — ASSESSMENT & PLAN NOTE
Overall he is doing well.  He is due for his repeat colonoscopy and he will schedule that himself after the first of the year.  I also encouraged him to stop at his local pharmacy and get his Shingrix vaccine.

## 2022-12-21 DIAGNOSIS — D64.9 ANEMIA, UNSPECIFIED TYPE: Primary | ICD-10-CM

## 2023-01-09 ENCOUNTER — CLINICAL SUPPORT (OUTPATIENT)
Dept: FAMILY MEDICINE CLINIC | Facility: CLINIC | Age: 63
End: 2023-01-09
Payer: COMMERCIAL

## 2023-01-09 DIAGNOSIS — D64.9 ANEMIA, UNSPECIFIED TYPE: ICD-10-CM

## 2023-01-09 DIAGNOSIS — Z11.59 NEED FOR HEPATITIS C SCREENING TEST: Primary | ICD-10-CM

## 2023-01-09 PROCEDURE — 83540 ASSAY OF IRON: CPT | Performed by: FAMILY MEDICINE

## 2023-01-09 PROCEDURE — 82728 ASSAY OF FERRITIN: CPT | Performed by: FAMILY MEDICINE

## 2023-01-09 PROCEDURE — 82746 ASSAY OF FOLIC ACID SERUM: CPT | Performed by: FAMILY MEDICINE

## 2023-01-09 PROCEDURE — 82607 VITAMIN B-12: CPT | Performed by: FAMILY MEDICINE

## 2023-01-09 PROCEDURE — 36415 COLL VENOUS BLD VENIPUNCTURE: CPT | Performed by: FAMILY MEDICINE

## 2023-01-09 PROCEDURE — 84466 ASSAY OF TRANSFERRIN: CPT | Performed by: FAMILY MEDICINE

## 2023-01-09 PROCEDURE — 86803 HEPATITIS C AB TEST: CPT | Performed by: FAMILY MEDICINE

## 2023-01-10 DIAGNOSIS — I10 PRIMARY HYPERTENSION: Primary | ICD-10-CM

## 2023-01-10 LAB
FERRITIN SERPL-MCNC: 149 NG/ML (ref 30–400)
FOLATE SERPL-MCNC: 14.6 NG/ML (ref 4.78–24.2)
HCV AB SER DONR QL: NORMAL
IRON 24H UR-MRATE: 93 MCG/DL (ref 59–158)
IRON SATN MFR SERPL: 20 % (ref 20–50)
TIBC SERPL-MCNC: 456 MCG/DL (ref 298–536)
TRANSFERRIN SERPL-MCNC: 306 MG/DL (ref 200–360)
VIT B12 BLD-MCNC: 519 PG/ML (ref 211–946)

## 2023-01-16 ENCOUNTER — CLINICAL SUPPORT (OUTPATIENT)
Dept: GASTROENTEROLOGY | Facility: CLINIC | Age: 63
End: 2023-01-16
Payer: COMMERCIAL

## 2023-01-16 ENCOUNTER — PREP FOR SURGERY (OUTPATIENT)
Dept: OTHER | Facility: HOSPITAL | Age: 63
End: 2023-01-16
Payer: COMMERCIAL

## 2023-01-16 DIAGNOSIS — Z86.010 HISTORY OF COLON POLYPS: Primary | ICD-10-CM

## 2023-01-16 PROBLEM — Z86.0100 HISTORY OF COLON POLYPS: Status: ACTIVE | Noted: 2023-01-16

## 2023-01-16 RX ORDER — SODIUM PICOSULFATE, MAGNESIUM OXIDE, AND ANHYDROUS CITRIC ACID 10; 3.5; 12 MG/160ML; G/160ML; G/160ML
160 LIQUID ORAL TAKE AS DIRECTED
Qty: 320 ML | Refills: 0 | Status: SHIPPED | OUTPATIENT
Start: 2023-01-16 | End: 2023-03-13 | Stop reason: SDUPTHER

## 2023-01-16 NOTE — PROGRESS NOTES
Babatunde Barlow  REASON FOR CALL: SCREENING CALL, LAST COLON 08.09.20, HISTORY OF COLON POLYPS   SENT IN PREP: CLENPIQ  DOS: 03.23.2023    Past Medical History:   Diagnosis Date   • Colon polyp 2018   • Edema    • Hyperlipidemia    • Hypertension    • Lumbar radiculopathy 11/29/2018   • VERNA on CPAP 12/17/2020    F/U WITH SLEEP CLINIC   • Seborrheic keratoses 11/25/2019     No Known Allergies  Past Surgical History:   Procedure Laterality Date   • COLONOSCOPY  08/09/2019 2018,2019;SUSY,HISTORY OF COLON POLYPS   • SHOULDER SURGERY      SHOULDER REPAIR     Social History     Socioeconomic History   • Marital status:    Tobacco Use   • Smoking status: Never   • Smokeless tobacco: Never   Vaping Use   • Vaping Use: Never used   Substance and Sexual Activity   • Alcohol use: Not Currently     Alcohol/week: 4.0 standard drinks     Types: 4 Cans of beer per week   • Drug use: Defer   • Sexual activity: Defer     Partners: Female     Birth control/protection: Surgical     Family History   Problem Relation Age of Onset   • Stroke Other    • Hypertension Other    • Cancer Other         UNSPECIFIED   • Diabetes Other    • Colon cancer Neg Hx        Current Outpatient Medications:   •  hydroCHLOROthiazide (HYDRODIURIL) 25 MG tablet, Take 1 tablet by mouth Daily for 90 days., Disp: 90 tablet, Rfl: 3  •  lisinopril (PRINIVIL,ZESTRIL) 40 MG tablet, Take 1 tablet by mouth Daily., Disp: 90 tablet, Rfl: 3  •  pravastatin (PRAVACHOL) 40 MG tablet, Take 1 tablet by mouth Daily., Disp: 90 tablet, Rfl: 3  •  traZODone (DESYREL) 50 MG tablet, Take 1 tablet by mouth Every Night., Disp: 90 tablet, Rfl: 1    Answers for HPI/ROS submitted by the patient on 1/10/2023  What is the primary reason for your visit?: Other  Please describe your symptoms.: None, three year follow up only  Have you had these symptoms before?: No  How long have you been having these symptoms?: Greater than 2 weeks  Please list any medications you are  currently taking for this condition.: BP and sleep

## 2023-03-13 RX ORDER — SODIUM PICOSULFATE, MAGNESIUM OXIDE, AND ANHYDROUS CITRIC ACID 10; 3.5; 12 MG/160ML; G/160ML; G/160ML
160 LIQUID ORAL TAKE AS DIRECTED
Qty: 320 ML | Refills: 0 | Status: SHIPPED | OUTPATIENT
Start: 2023-03-13 | End: 2023-03-16

## 2023-03-16 ENCOUNTER — TELEPHONE (OUTPATIENT)
Dept: GASTROENTEROLOGY | Facility: CLINIC | Age: 63
End: 2023-03-16
Payer: COMMERCIAL

## 2023-03-16 DIAGNOSIS — Z12.11 COLON CANCER SCREENING: Primary | ICD-10-CM

## 2023-03-16 RX ORDER — SODIUM, POTASSIUM,MAG SULFATES 17.5-3.13G
1 SOLUTION, RECONSTITUTED, ORAL ORAL EVERY 12 HOURS
Qty: 354 ML | Refills: 0 | Status: ON HOLD | OUTPATIENT
Start: 2023-03-16 | End: 2023-03-31

## 2023-03-16 NOTE — TELEPHONE ENCOUNTER
Pharmacy sent a request to change the bowel prep. Clenpiq was not covered. Sending suprep to the patients pharmacy.

## 2023-03-17 ENCOUNTER — TELEPHONE (OUTPATIENT)
Dept: GASTROENTEROLOGY | Facility: CLINIC | Age: 63
End: 2023-03-17
Payer: COMMERCIAL

## 2023-03-17 NOTE — TELEPHONE ENCOUNTER
Attempted to call Mr Barlow line was busy.   Was to remind him of his scheduled colonoscopy on 03.31.23.  Remind of liquid diet the day prior. Remind of bowel prep and instructions.  Hospital nurse will call to give arrival time and go over Rx's.  Will send [employer] message too. maggi   Called parent and left message that prescription was sent to the pharmacy.

## 2023-03-31 ENCOUNTER — ANESTHESIA EVENT (OUTPATIENT)
Dept: GASTROENTEROLOGY | Facility: HOSPITAL | Age: 63
End: 2023-03-31
Payer: COMMERCIAL

## 2023-03-31 ENCOUNTER — HOSPITAL ENCOUNTER (OUTPATIENT)
Facility: HOSPITAL | Age: 63
Setting detail: HOSPITAL OUTPATIENT SURGERY
Discharge: HOME | End: 2023-03-31
Attending: INTERNAL MEDICINE | Admitting: INTERNAL MEDICINE
Payer: COMMERCIAL

## 2023-03-31 ENCOUNTER — ANESTHESIA (OUTPATIENT)
Dept: GASTROENTEROLOGY | Facility: HOSPITAL | Age: 63
End: 2023-03-31
Payer: COMMERCIAL

## 2023-03-31 VITALS
TEMPERATURE: 97.8 F | HEART RATE: 60 BPM | SYSTOLIC BLOOD PRESSURE: 115 MMHG | BODY MASS INDEX: 28.23 KG/M2 | WEIGHT: 202.38 LBS | OXYGEN SATURATION: 98 % | DIASTOLIC BLOOD PRESSURE: 68 MMHG | RESPIRATION RATE: 15 BRPM

## 2023-03-31 PROCEDURE — 25010000002 PROPOFOL 10 MG/ML EMULSION: Performed by: NURSE ANESTHETIST, CERTIFIED REGISTERED

## 2023-03-31 PROCEDURE — 45378 DIAGNOSTIC COLONOSCOPY: CPT | Performed by: INTERNAL MEDICINE

## 2023-03-31 RX ORDER — SODIUM CHLORIDE, SODIUM LACTATE, POTASSIUM CHLORIDE, CALCIUM CHLORIDE 600; 310; 30; 20 MG/100ML; MG/100ML; MG/100ML; MG/100ML
30 INJECTION, SOLUTION INTRAVENOUS CONTINUOUS
Status: DISCONTINUED | OUTPATIENT
Start: 2023-03-31 | End: 2023-03-31 | Stop reason: HOSPADM

## 2023-03-31 RX ORDER — SODIUM CHLORIDE, SODIUM LACTATE, POTASSIUM CHLORIDE, CALCIUM CHLORIDE 600; 310; 30; 20 MG/100ML; MG/100ML; MG/100ML; MG/100ML
1000 INJECTION, SOLUTION INTRAVENOUS CONTINUOUS
Status: DISCONTINUED | OUTPATIENT
Start: 2023-03-31 | End: 2023-03-31 | Stop reason: HOSPADM

## 2023-03-31 RX ORDER — PROPOFOL 10 MG/ML
VIAL (ML) INTRAVENOUS AS NEEDED
Status: DISCONTINUED | OUTPATIENT
Start: 2023-03-31 | End: 2023-03-31 | Stop reason: SURG

## 2023-03-31 RX ORDER — LIDOCAINE HYDROCHLORIDE 20 MG/ML
INJECTION, SOLUTION EPIDURAL; INFILTRATION; INTRACAUDAL; PERINEURAL AS NEEDED
Status: DISCONTINUED | OUTPATIENT
Start: 2023-03-31 | End: 2023-03-31 | Stop reason: SURG

## 2023-03-31 RX ADMIN — PROPOFOL 100 MG: 10 INJECTION, EMULSION INTRAVENOUS at 07:42

## 2023-03-31 RX ADMIN — SODIUM CHLORIDE, POTASSIUM CHLORIDE, SODIUM LACTATE AND CALCIUM CHLORIDE 1000 ML: 600; 310; 30; 20 INJECTION, SOLUTION INTRAVENOUS at 06:43

## 2023-03-31 RX ADMIN — LIDOCAINE HYDROCHLORIDE 100 MG: 20 INJECTION, SOLUTION EPIDURAL; INFILTRATION; INTRACAUDAL; PERINEURAL at 07:42

## 2023-03-31 RX ADMIN — PROPOFOL 250 MCG/KG/MIN: 10 INJECTION, EMULSION INTRAVENOUS at 07:42

## 2023-03-31 NOTE — ANESTHESIA POSTPROCEDURE EVALUATION
Patient: Babatunde Barlow    Procedure Summary     Date: 03/31/23 Room / Location: [facility] ENDOSCOPY 2 / [facility] ENDOSCOPY    Anesthesia Start: 0741 Anesthesia Stop: 0811    Procedure: COLONOSCOPY Diagnosis:       History of colon polyps      (History of colon polyps [Z86.010])    Surgeons: Wili Perez MD Provider: Flaquito Boone MD    Anesthesia Type: general ASA Status: 2          Anesthesia Type: general    Vitals  Vitals Value Taken Time   /68 03/31/23 0825   Temp 36.6 °C (97.8 °F) 03/31/23 0825   Pulse 60 03/31/23 0825   Resp 15 03/31/23 0825   SpO2 98 % 03/31/23 0825           Post Anesthesia Care and Evaluation    Patient location during evaluation: bedside  Patient participation: complete - patient participated  Level of consciousness: awake and alert  Pain management: adequate    Airway patency: patent  Anesthetic complications: No anesthetic complications  PONV Status: none  Cardiovascular status: acceptable  Respiratory status: acceptable  Hydration status: acceptable    Comments: An Anesthesiologist personally participated in the most demanding procedures (including induction and emergence if applicable) in the anesthesia plan, monitored the course of anesthesia administration at frequent intervals and remained physically present and available for immediate diagnosis and treatment of emergencies.

## 2023-03-31 NOTE — ANESTHESIA PREPROCEDURE EVALUATION
Anesthesia Evaluation     Patient summary reviewed and Nursing notes reviewed   no history of anesthetic complications:  NPO Solid Status: > 8 hours  NPO Liquid Status: > 2 hours           Airway   Mallampati: II  TM distance: >3 FB  Neck ROM: full  No difficulty expected  Dental      Pulmonary - normal exam    breath sounds clear to auscultation  (+) sleep apnea,   Cardiovascular - normal exam  Exercise tolerance: good (4-7 METS)    Rhythm: regular  Rate: normal    (+) hypertension well controlled, hyperlipidemia,       Neuro/Psych  (+) numbness,    GI/Hepatic/Renal/Endo - negative ROS     Musculoskeletal (-) negative ROS    Abdominal    Substance History - negative use     OB/GYN negative ob/gyn ROS         Other - negative ROS       ROS/Med Hx Other: PAT Nursing Notes unavailable.                 Anesthesia Plan    ASA 2     general     (Total IV Anesthesia    Patient understands anesthesia not responsible for dental damage.  )  intravenous induction     Anesthetic plan, risks, benefits, and alternatives have been provided, discussed and informed consent has been obtained with: patient.  Pre-procedure education provided  Plan discussed with CRNA.        CODE STATUS:

## 2023-03-31 NOTE — H&P
ScreeningPre Procedure History & Physical    Chief Complaint:   Screening     Subjective     HPI:   Screening     Past Medical History:   Past Medical History:   Diagnosis Date   • Colon polyp 2018   • Edema    • Hyperlipidemia    • Hypertension    • Lumbar radiculopathy 11/29/2018   • VERNA on CPAP 12/17/2020    F/U WITH SLEEP CLINIC   • Seborrheic keratoses 11/25/2019       Past Surgical History:  Past Surgical History:   Procedure Laterality Date   • COLONOSCOPY  08/09/2019    2018,2019;SUSY,HISTORY OF COLON POLYPS   • SHOULDER SURGERY Right     SHOULDER REPAIR       Family History:  Family History   Problem Relation Age of Onset   • Stroke Other    • Hypertension Other    • Cancer Other         UNSPECIFIED   • Diabetes Other    • Colon cancer Neg Hx        Social History:   reports that he has never smoked. He has never used smokeless tobacco. He reports current alcohol use of about 4.0 standard drinks per week. Drug use questions deferred to the physician.    Medications:   Medications Prior to Admission   Medication Sig Dispense Refill Last Dose   • hydroCHLOROthiazide (HYDRODIURIL) 25 MG tablet Take 1 tablet by mouth Daily for 90 days. 90 tablet 3 3/30/2023   • lisinopril (PRINIVIL,ZESTRIL) 40 MG tablet Take 1 tablet by mouth Daily. 90 tablet 3 3/30/2023   • pravastatin (PRAVACHOL) 40 MG tablet Take 1 tablet by mouth Daily. 90 tablet 3 Past Week   • traZODone (DESYREL) 50 MG tablet Take 1 tablet by mouth Every Night. 90 tablet 1 Past Week       Allergies:  Patient has no known allergies.        Objective     Blood pressure 121/75, pulse 59, temperature 97.5 °F (36.4 °C), temperature source Temporal, resp. rate 16, weight 91.8 kg (202 lb 6.1 oz), SpO2 98 %.    Physical Exam   Constitutional: Pt is oriented to person, place, and time and well-developed, well-nourished, and in no distress.   Mouth/Throat: Oropharynx is clear and moist.   Neck: Normal range of motion.   Cardiovascular: Normal rate, regular  rhythm and normal heart sounds.    Pulmonary/Chest: Effort normal and breath sounds normal.   Abdominal: Soft. Nontender  Skin: Skin is warm and dry.   Psychiatric: Mood, memory, affect and judgment normal.     Assessment & Plan     Diagnosis:  Screening colonoscopy  H/o colon polyps     Anticipated Surgical Procedure:  colonoscopy    The risks, benefits, and alternatives of this procedure have been discussed with the patient or the responsible party- the patient understands and agrees to proceed.

## 2023-06-05 DIAGNOSIS — F51.01 PRIMARY INSOMNIA: Primary | ICD-10-CM

## 2023-06-05 RX ORDER — TRAZODONE HYDROCHLORIDE 50 MG/1
75 TABLET ORAL NIGHTLY
Qty: 180 TABLET | Refills: 1 | Status: SHIPPED | OUTPATIENT
Start: 2023-06-05 | End: 2023-06-11 | Stop reason: SDUPTHER

## 2023-06-11 DIAGNOSIS — F51.01 PRIMARY INSOMNIA: ICD-10-CM

## 2023-06-12 RX ORDER — TRAZODONE HYDROCHLORIDE 50 MG/1
75 TABLET ORAL NIGHTLY
Qty: 180 TABLET | Refills: 1 | Status: SHIPPED | OUTPATIENT
Start: 2023-06-12

## 2023-12-18 RX ORDER — LISINOPRIL 40 MG/1
40 TABLET ORAL DAILY
Qty: 90 TABLET | Refills: 3 | Status: SHIPPED | OUTPATIENT
Start: 2023-12-18

## 2023-12-18 RX ORDER — PRAVASTATIN SODIUM 40 MG
40 TABLET ORAL DAILY
Qty: 90 TABLET | Refills: 3 | Status: SHIPPED | OUTPATIENT
Start: 2023-12-18

## 2023-12-18 RX ORDER — HYDROCHLOROTHIAZIDE 25 MG/1
25 TABLET ORAL DAILY
Qty: 90 TABLET | Refills: 3 | Status: SHIPPED | OUTPATIENT
Start: 2023-12-18

## 2024-02-07 ENCOUNTER — CLINICAL SUPPORT (OUTPATIENT)
Dept: FAMILY MEDICINE CLINIC | Facility: CLINIC | Age: 64
End: 2024-02-07
Payer: COMMERCIAL

## 2024-02-07 DIAGNOSIS — Z00.00 ANNUAL PHYSICAL EXAM: ICD-10-CM

## 2024-02-07 DIAGNOSIS — I10 PRIMARY HYPERTENSION: Primary | ICD-10-CM

## 2024-02-07 DIAGNOSIS — E78.5 HYPERLIPIDEMIA, UNSPECIFIED HYPERLIPIDEMIA TYPE: ICD-10-CM

## 2024-02-07 LAB
ALBUMIN SERPL-MCNC: 4.6 G/DL (ref 3.5–5.2)
ALBUMIN/GLOB SERPL: 2.2 G/DL
ALP SERPL-CCNC: 46 U/L (ref 39–117)
ALT SERPL W P-5'-P-CCNC: 26 U/L (ref 1–41)
ANION GAP SERPL CALCULATED.3IONS-SCNC: 13.8 MMOL/L (ref 5–15)
AST SERPL-CCNC: 31 U/L (ref 1–40)
BASOPHILS # BLD AUTO: 0.03 10*3/MM3 (ref 0–0.2)
BASOPHILS NFR BLD AUTO: 0.5 % (ref 0–1.5)
BILIRUB SERPL-MCNC: 0.5 MG/DL (ref 0–1.2)
BUN SERPL-MCNC: 28 MG/DL (ref 8–23)
BUN/CREAT SERPL: 20.4 (ref 7–25)
CALCIUM SPEC-SCNC: 9.2 MG/DL (ref 8.6–10.5)
CHLORIDE SERPL-SCNC: 103 MMOL/L (ref 98–107)
CHOLEST SERPL-MCNC: 187 MG/DL (ref 0–200)
CO2 SERPL-SCNC: 22.2 MMOL/L (ref 22–29)
CREAT SERPL-MCNC: 1.37 MG/DL (ref 0.76–1.27)
DEPRECATED RDW RBC AUTO: 42.5 FL (ref 37–54)
EGFRCR SERPLBLD CKD-EPI 2021: 57.2 ML/MIN/1.73
EOSINOPHIL # BLD AUTO: 0.05 10*3/MM3 (ref 0–0.4)
EOSINOPHIL NFR BLD AUTO: 0.9 % (ref 0.3–6.2)
ERYTHROCYTE [DISTWIDTH] IN BLOOD BY AUTOMATED COUNT: 13.1 % (ref 12.3–15.4)
GLOBULIN UR ELPH-MCNC: 2.1 GM/DL
GLUCOSE SERPL-MCNC: 85 MG/DL (ref 65–99)
HCT VFR BLD AUTO: 36.7 % (ref 37.5–51)
HDLC SERPL-MCNC: 45 MG/DL (ref 40–60)
HGB BLD-MCNC: 12.4 G/DL (ref 13–17.7)
IMM GRANULOCYTES # BLD AUTO: 0.01 10*3/MM3 (ref 0–0.05)
IMM GRANULOCYTES NFR BLD AUTO: 0.2 % (ref 0–0.5)
LDLC SERPL CALC-MCNC: 111 MG/DL (ref 0–100)
LDLC/HDLC SERPL: 2.36 {RATIO}
LYMPHOCYTES # BLD AUTO: 1.64 10*3/MM3 (ref 0.7–3.1)
LYMPHOCYTES NFR BLD AUTO: 28.1 % (ref 19.6–45.3)
MCH RBC QN AUTO: 29.9 PG (ref 26.6–33)
MCHC RBC AUTO-ENTMCNC: 33.8 G/DL (ref 31.5–35.7)
MCV RBC AUTO: 88.4 FL (ref 79–97)
MONOCYTES # BLD AUTO: 0.43 10*3/MM3 (ref 0.1–0.9)
MONOCYTES NFR BLD AUTO: 7.4 % (ref 5–12)
NEUTROPHILS NFR BLD AUTO: 3.67 10*3/MM3 (ref 1.7–7)
NEUTROPHILS NFR BLD AUTO: 62.9 % (ref 42.7–76)
NRBC BLD AUTO-RTO: 0 /100 WBC (ref 0–0.2)
PLATELET # BLD AUTO: 209 10*3/MM3 (ref 140–450)
PMV BLD AUTO: 10.1 FL (ref 6–12)
POTASSIUM SERPL-SCNC: 4 MMOL/L (ref 3.5–5.2)
PROT SERPL-MCNC: 6.7 G/DL (ref 6–8.5)
RBC # BLD AUTO: 4.15 10*6/MM3 (ref 4.14–5.8)
SODIUM SERPL-SCNC: 139 MMOL/L (ref 136–145)
TRIGL SERPL-MCNC: 178 MG/DL (ref 0–150)
TSH SERPL DL<=0.05 MIU/L-ACNC: 0.92 UIU/ML (ref 0.27–4.2)
VLDLC SERPL-MCNC: 31 MG/DL (ref 5–40)
WBC NRBC COR # BLD AUTO: 5.83 10*3/MM3 (ref 3.4–10.8)

## 2024-02-07 PROCEDURE — 80050 GENERAL HEALTH PANEL: CPT | Performed by: FAMILY MEDICINE

## 2024-02-07 PROCEDURE — 80061 LIPID PANEL: CPT | Performed by: FAMILY MEDICINE

## 2024-02-07 PROCEDURE — 36415 COLL VENOUS BLD VENIPUNCTURE: CPT | Performed by: FAMILY MEDICINE

## 2024-04-02 ENCOUNTER — OFFICE VISIT (OUTPATIENT)
Dept: FAMILY MEDICINE CLINIC | Facility: CLINIC | Age: 64
End: 2024-04-02
Payer: COMMERCIAL

## 2024-04-02 VITALS
DIASTOLIC BLOOD PRESSURE: 74 MMHG | OXYGEN SATURATION: 98 % | HEIGHT: 71 IN | HEART RATE: 68 BPM | TEMPERATURE: 97.8 F | BODY MASS INDEX: 31.22 KG/M2 | SYSTOLIC BLOOD PRESSURE: 120 MMHG | WEIGHT: 223 LBS

## 2024-04-02 DIAGNOSIS — I10 PRIMARY HYPERTENSION: Primary | ICD-10-CM

## 2024-04-02 DIAGNOSIS — E78.00 PURE HYPERCHOLESTEROLEMIA: ICD-10-CM

## 2024-04-02 DIAGNOSIS — Z00.00 ANNUAL PHYSICAL EXAM: ICD-10-CM

## 2024-04-02 DIAGNOSIS — R00.2 PALPITATIONS: ICD-10-CM

## 2024-04-02 DIAGNOSIS — Z12.5 SCREENING FOR MALIGNANT NEOPLASM OF PROSTATE: ICD-10-CM

## 2024-04-02 DIAGNOSIS — M79.671 RIGHT FOOT PAIN: ICD-10-CM

## 2024-04-02 LAB
DEPRECATED RDW RBC AUTO: 40.6 FL (ref 37–54)
ERYTHROCYTE [DISTWIDTH] IN BLOOD BY AUTOMATED COUNT: 12.8 % (ref 12.3–15.4)
HCT VFR BLD AUTO: 38.7 % (ref 37.5–51)
HGB BLD-MCNC: 13.2 G/DL (ref 13–17.7)
MCH RBC QN AUTO: 29.7 PG (ref 26.6–33)
MCHC RBC AUTO-ENTMCNC: 34.1 G/DL (ref 31.5–35.7)
MCV RBC AUTO: 87.2 FL (ref 79–97)
PLATELET # BLD AUTO: 225 10*3/MM3 (ref 140–450)
PMV BLD AUTO: 9.8 FL (ref 6–12)
PSA SERPL-MCNC: 1.03 NG/ML (ref 0–4)
RBC # BLD AUTO: 4.44 10*6/MM3 (ref 4.14–5.8)
T4 FREE SERPL-MCNC: 1.4 NG/DL (ref 0.93–1.7)
TSH SERPL DL<=0.05 MIU/L-ACNC: 1.67 UIU/ML (ref 0.27–4.2)
URATE SERPL-MCNC: 6.9 MG/DL (ref 3.4–7)
WBC NRBC COR # BLD AUTO: 7.04 10*3/MM3 (ref 3.4–10.8)

## 2024-04-02 PROCEDURE — 84550 ASSAY OF BLOOD/URIC ACID: CPT | Performed by: FAMILY MEDICINE

## 2024-04-02 PROCEDURE — G0103 PSA SCREENING: HCPCS | Performed by: FAMILY MEDICINE

## 2024-04-02 PROCEDURE — 84439 ASSAY OF FREE THYROXINE: CPT | Performed by: FAMILY MEDICINE

## 2024-04-02 PROCEDURE — 84443 ASSAY THYROID STIM HORMONE: CPT | Performed by: FAMILY MEDICINE

## 2024-04-02 PROCEDURE — 85027 COMPLETE CBC AUTOMATED: CPT | Performed by: FAMILY MEDICINE

## 2024-04-02 NOTE — ASSESSMENT & PLAN NOTE
His foot is rather unremarkable today.  It is worse if he goes barefooted or wears unsupportive footwear.  It is more likely arthritic.  Will get an x-ray of his foot and check a uric acid level.

## 2024-04-02 NOTE — ASSESSMENT & PLAN NOTE
Overall he is doing well except for his complaints of foot pain and palpitations.  His recent labs were good.  He does need a PSA.

## 2024-04-02 NOTE — PROGRESS NOTES
Chief Complaint   Patient presents with    Annual Exam     C/O chest palpitations  C/O arthritis in foot            Subjective     Babatunde Barlow  has a past medical history of Colon polyp (2018), Edema, Hyperlipidemia, Hypertension, Lumbar radiculopathy (11/29/2018), VERNA on CPAP (12/17/2020), and Seborrheic keratoses (11/25/2019).    Hypertension-he does check his blood pressure at home.  Typically its in the 120s over 80s.  It is good here today at 120/74.    Hyperlipidemia-he takes his pravastatin on a nightly basis.    Palpitations-he states for about the last 4 months he has some episodes of palpitations or tachycardia.  He states he does not correlate this with any type of activity.  He has had episodes that is actually awakened him from a sound sleep.  He usually is asymptomatic.  He has had an occasion where he had some tightness but no shortness of breath.  He states he usually lasts 3 minutes or less.        PHQ-2 Depression Screening  Little interest or pleasure in doing things?     Feeling down, depressed, or hopeless?     PHQ-2 Total Score     PHQ-9 Depression Screening  Little interest or pleasure in doing things?     Feeling down, depressed, or hopeless?     Trouble falling or staying asleep, or sleeping too much?     Feeling tired or having little energy?     Poor appetite or overeating?     Feeling bad about yourself - or that you are a failure or have let yourself or your family down?     Trouble concentrating on things, such as reading the newspaper or watching television?     Moving or speaking so slowly that other people could have noticed? Or the opposite - being so fidgety or restless that you have been moving around a lot more than usual?     Thoughts that you would be better off dead, or of hurting yourself in some way?     PHQ-9 Total Score     If you checked off any problems, how difficult have these problems made it for you to do your work, take care of things at home, or get along  with other people?       No Known Allergies    Prior to Admission medications    Medication Sig Start Date End Date Taking? Authorizing Provider   hydroCHLOROthiazide 25 MG tablet Take 1 tablet by mouth Daily. 12/18/23  Yes Fam Gutierrez, DO   lisinopril (PRINIVIL,ZESTRIL) 40 MG tablet Take 1 tablet by mouth Daily. 12/18/23  Yes Fam Gutierrez, DO   pravastatin (PRAVACHOL) 40 MG tablet Take 1 tablet by mouth Daily. 12/18/23  Yes Fam Gutierrez, DO   traZODone (DESYREL) 50 MG tablet Take 1.5 tablets by mouth Every Night. 6/12/23  Yes En Noonan APRN        Patient Active Problem List   Diagnosis    Hyperlipidemia    Hypertension    VERNA on CPAP    Annual physical exam    Screening for malignant neoplasm of prostate    Primary insomnia    History of colon polyps    Palpitations    Right foot pain        Past Surgical History:   Procedure Laterality Date    COLONOSCOPY  08/09/2019 2018,2019;SUSY,HISTORY OF COLON POLYPS    COLONOSCOPY N/A 3/31/2023    Procedure: COLONOSCOPY;  Surgeon: Wili Perez MD;  Location: [facility] ENDOSCOPY;  Service: Gastroenterology;  Laterality: N/A;  DIVERTICULOSIS    SHOULDER SURGERY Right     SHOULDER REPAIR       Social History     Socioeconomic History    Marital status:    Tobacco Use    Smoking status: Never    Smokeless tobacco: Never   Vaping Use    Vaping status: Never Used   Substance and Sexual Activity    Alcohol use: Yes     Alcohol/week: 4.0 standard drinks of alcohol     Types: 4 Cans of beer per week     Comment: SOCIALLY    Drug use: Defer    Sexual activity: Defer     Partners: Female     Birth control/protection: Surgical       Family History   Problem Relation Age of Onset    Stroke Other     Hypertension Other     Cancer Other         UNSPECIFIED    Diabetes Other     Colon cancer Neg Hx        Family history, surgical history, past medical history, Allergies and meds reviewed with patient today and updated in  "[facility] EMR.     ROS:  Review of Systems   Constitutional:  Negative for fatigue.   HENT:  Negative for congestion, postnasal drip and rhinorrhea.    Eyes:  Negative for blurred vision and visual disturbance.   Respiratory:  Negative for cough, chest tightness, shortness of breath and wheezing.    Cardiovascular:  Positive for palpitations. Negative for chest pain.   Musculoskeletal:  Positive for arthralgias.   Allergic/Immunologic: Negative for environmental allergies.   Neurological:  Positive for headache.   Psychiatric/Behavioral:  Negative for depressed mood. The patient is not nervous/anxious.        OBJECTIVE:  Vitals:    04/02/24 1502   BP: 120/74   BP Location: Left arm   Patient Position: Sitting   Pulse: 68   Temp: 97.8 °F (36.6 °C)   SpO2: 98%   Weight: 101 kg (223 lb)   Height: 180.3 cm (71\")     No results found.   Body mass index is 31.1 kg/m².  No LMP for male patient.    The 10-year ASCVD risk score (Jeramie DYER, et al., 2019) is: 13.4%    Values used to calculate the score:      Age: 63 years      Sex: Male      Is Non- : No      Diabetic: No      Tobacco smoker: No      Systolic Blood Pressure: 120 mmHg      Is BP treated: Yes      HDL Cholesterol: 45 mg/dL      Total Cholesterol: 187 mg/dL     Physical Exam  Vitals and nursing note reviewed.   Constitutional:       General: He is not in acute distress.     Appearance: Normal appearance. He is normal weight.   HENT:      Head: Normocephalic.      Right Ear: Tympanic membrane, ear canal and external ear normal.      Left Ear: Tympanic membrane, ear canal and external ear normal.      Nose: Nose normal.      Mouth/Throat:      Mouth: Mucous membranes are moist.      Pharynx: Oropharynx is clear.   Eyes:      General: No scleral icterus.     Conjunctiva/sclera: Conjunctivae normal.      Pupils: Pupils are equal, round, and reactive to light.   Cardiovascular:      Rate and Rhythm: Normal rate and regular rhythm.      Pulses: " Normal pulses.      Heart sounds: Normal heart sounds. No murmur heard.  Pulmonary:      Effort: Pulmonary effort is normal.      Breath sounds: Normal breath sounds. No wheezing, rhonchi or rales.   Musculoskeletal:      Cervical back: Neck supple. No rigidity or tenderness.      Right foot: No deformity.   Feet:      Right foot:      Skin integrity: No erythema, warmth or callus.   Lymphadenopathy:      Cervical: No cervical adenopathy.   Skin:     General: Skin is warm and dry.      Coloration: Skin is not jaundiced.      Findings: No rash.   Neurological:      General: No focal deficit present.      Mental Status: He is alert and oriented to person, place, and time.   Psychiatric:         Mood and Affect: Mood normal.         Thought Content: Thought content normal.         Judgment: Judgment normal.           ECG 12 Lead    Date/Time: 4/2/2024 3:29 PM  Performed by: Fam Gutierrez DO    Authorized by: Fam Gutierrez DO  Comparison: not compared with previous ECG   Rhythm: sinus rhythm  Rate: normal  Conduction: conduction normal  ST Segments: ST segments normal  T Waves: T waves normal  QRS axis: normal  Other: no other findings    Clinical impression: normal ECG          Office Visit on 04/02/2024   Component Date Value Ref Range Status    WBC 04/02/2024 7.04  3.40 - 10.80 10*3/mm3 Final    RBC 04/02/2024 4.44  4.14 - 5.80 10*6/mm3 Final    Hemoglobin 04/02/2024 13.2  13.0 - 17.7 g/dL Final    Hematocrit 04/02/2024 38.7  37.5 - 51.0 % Final    MCV 04/02/2024 87.2  79.0 - 97.0 fL Final    MCH 04/02/2024 29.7  26.6 - 33.0 pg Final    MCHC 04/02/2024 34.1  31.5 - 35.7 g/dL Final    RDW 04/02/2024 12.8  12.3 - 15.4 % Final    RDW-SD 04/02/2024 40.6  37.0 - 54.0 fl Final    MPV 04/02/2024 9.8  6.0 - 12.0 fL Final    Platelets 04/02/2024 225  140 - 450 10*3/mm3 Final    TSH 04/02/2024 1.670  0.270 - 4.200 uIU/mL Final    Free T4 04/02/2024 1.40  0.93 - 1.70 ng/dL Final    T4 results may be  falsely increased if patient taking Biotin.    PSA 04/02/2024 1.030  0.000 - 4.000 ng/mL Final    Uric Acid 04/02/2024 6.9  3.4 - 7.0 mg/dL Final       ASSESSMENT/ PLAN:    Diagnoses and all orders for this visit:    1. Primary hypertension (Primary)  Assessment & Plan:  His blood pressure is good here as well as at home.  His recent labs are all very good.    Orders:  -     CBC (No Diff)    2. Pure hypercholesterolemia  Assessment & Plan:   His recent lipid profile was good.  Will continue his pravastatin nightly.    Orders:  -     TSH+Free T4    3. Annual physical exam  Assessment & Plan:  Overall he is doing well except for his complaints of foot pain and palpitations.  His recent labs were good.  He does need a PSA.      4. Screening for malignant neoplasm of prostate  -     PSA Screen    5. Palpitations  Assessment & Plan:  He has been having some intermittent episodes of palpitations and tachycardia.  These are not overly frequent.  They only occur about once a week.  Will set him up for an event monitor.    Orders:  -     ECG 12 Lead  -     Cardiac event monitor; Future    6. Right foot pain  Assessment & Plan:  His foot is rather unremarkable today.  It is worse if he goes barefooted or wears unsupportive footwear.  It is more likely arthritic.  Will get an x-ray of his foot and check a uric acid level.    Orders:  -     XR Foot 3+ View Right; Future  -     Uric Acid    Other orders  -     Pneumococcal Conjugate Vaccine 20-Valent All        BMI is >= 30 and <35. (Class 1 Obesity). The following options were offered after discussion;: exercise counseling/recommendations and nutrition counseling/recommendations      Orders Placed Today:     No orders of the defined types were placed in this encounter.       Management Plan:     An After Visit Summary was printed and given to the patient at discharge.    Follow-up: No follow-ups on file.    Fam Gutierrez DO 4/3/2024 08:19 EDT  This note was  electronically signed.

## 2024-04-02 NOTE — ASSESSMENT & PLAN NOTE
He has been having some intermittent episodes of palpitations and tachycardia.  These are not overly frequent.  They only occur about once a week.  Will set him up for an event monitor.

## 2024-04-10 ENCOUNTER — HOSPITAL ENCOUNTER (OUTPATIENT)
Dept: GENERAL RADIOLOGY | Facility: HOSPITAL | Age: 64
Discharge: HOME | End: 2024-04-10
Admitting: FAMILY MEDICINE
Payer: COMMERCIAL

## 2024-04-10 DIAGNOSIS — M79.671 RIGHT FOOT PAIN: ICD-10-CM

## 2024-04-10 PROCEDURE — 73630 X-RAY EXAM OF FOOT: CPT

## 2024-05-06 DIAGNOSIS — F51.01 PRIMARY INSOMNIA: ICD-10-CM

## 2024-05-07 RX ORDER — TRAZODONE HYDROCHLORIDE 50 MG/1
75 TABLET ORAL NIGHTLY
Qty: 180 TABLET | Refills: 1 | Status: SHIPPED | OUTPATIENT
Start: 2024-05-07

## 2024-05-14 DIAGNOSIS — R00.2 PALPITATIONS: Primary | ICD-10-CM

## 2024-05-23 ENCOUNTER — HOSPITAL ENCOUNTER (OUTPATIENT)
Dept: NUCLEAR MEDICINE | Facility: HOSPITAL | Age: 64
Discharge: HOME | End: 2024-05-23
Payer: COMMERCIAL

## 2024-05-23 ENCOUNTER — TELEPHONE (OUTPATIENT)
Dept: CARDIOLOGY | Facility: CLINIC | Age: 64
End: 2024-05-23

## 2024-05-23 DIAGNOSIS — R94.39 ABNORMAL STRESS TEST: Primary | ICD-10-CM

## 2024-05-23 DIAGNOSIS — R00.2 PALPITATIONS: ICD-10-CM

## 2024-05-23 LAB
BH CV REST NUCLEAR ISOTOPE DOSE: 10.2 MCI
BH CV STRESS BP STAGE 1: NORMAL
BH CV STRESS BP STAGE 2: NORMAL
BH CV STRESS BP STAGE 3: NORMAL
BH CV STRESS DURATION MIN STAGE 1: 3
BH CV STRESS DURATION MIN STAGE 2: 3
BH CV STRESS DURATION MIN STAGE 3: 3
BH CV STRESS DURATION SEC STAGE 1: 0
BH CV STRESS DURATION SEC STAGE 2: 0
BH CV STRESS DURATION SEC STAGE 3: 0
BH CV STRESS GRADE STAGE 1: 10
BH CV STRESS GRADE STAGE 2: 12
BH CV STRESS GRADE STAGE 3: 14
BH CV STRESS HR STAGE 1: 101
BH CV STRESS HR STAGE 2: 128
BH CV STRESS HR STAGE 3: 156
BH CV STRESS METS STAGE 1: 5
BH CV STRESS METS STAGE 2: 7.5
BH CV STRESS METS STAGE 3: 10
BH CV STRESS NUCLEAR ISOTOPE DOSE: 33.6 MCI
BH CV STRESS PROTOCOL 1: NORMAL
BH CV STRESS RECOVERY BP: NORMAL MMHG
BH CV STRESS RECOVERY HR: 86 BPM
BH CV STRESS SPEED STAGE 1: 1.7
BH CV STRESS SPEED STAGE 2: 2.5
BH CV STRESS SPEED STAGE 3: 3.4
BH CV STRESS STAGE 1: 1
BH CV STRESS STAGE 2: 2
BH CV STRESS STAGE 3: 3
LV EF NUC BP: 60 %
MAXIMAL PREDICTED HEART RATE: 155 BPM
PERCENT MAX PREDICTED HR: 100.65 %
STRESS BASELINE BP: NORMAL MMHG
STRESS BASELINE HR: 59 BPM
STRESS PERCENT HR: 118 %
STRESS POST ESTIMATED WORKLOAD: 10.2 METS
STRESS POST EXERCISE DUR MIN: 9 MIN
STRESS POST EXERCISE DUR SEC: 0 SEC
STRESS POST PEAK BP: NORMAL MMHG
STRESS POST PEAK HR: 156 BPM
STRESS TARGET HR: 132 BPM

## 2024-05-23 PROCEDURE — 0 TECHNETIUM SESTAMIBI: Performed by: FAMILY MEDICINE

## 2024-05-23 PROCEDURE — A9500 TC99M SESTAMIBI: HCPCS | Performed by: FAMILY MEDICINE

## 2024-05-23 PROCEDURE — 93017 CV STRESS TEST TRACING ONLY: CPT

## 2024-05-23 PROCEDURE — 78452 HT MUSCLE IMAGE SPECT MULT: CPT

## 2024-05-23 RX ADMIN — TECHNETIUM TC 99M SESTAMIBI 1 DOSE: 1 INJECTION INTRAVENOUS at 08:40

## 2024-05-23 RX ADMIN — TECHNETIUM TC 99M SESTAMIBI 1 DOSE: 1 INJECTION INTRAVENOUS at 07:30

## 2024-05-23 NOTE — TELEPHONE ENCOUNTER
I did call the office but had to speak to the HUB who ultimately was just going to to put a message into the patient's chart any ways..

## 2024-05-23 NOTE — TELEPHONE ENCOUNTER
I read an outpatient stress test that was done at the hospital. The images are okay but the EKG is markedly abnormal. Can you call his PCP's office to make sure they have the results and how they want to proceed?    Sherwin pate

## 2024-05-29 ENCOUNTER — OFFICE VISIT (OUTPATIENT)
Dept: CARDIOLOGY | Facility: CLINIC | Age: 64
End: 2024-05-29
Payer: COMMERCIAL

## 2024-05-29 VITALS
WEIGHT: 219.4 LBS | HEART RATE: 64 BPM | BODY MASS INDEX: 30.72 KG/M2 | DIASTOLIC BLOOD PRESSURE: 78 MMHG | HEIGHT: 71 IN | SYSTOLIC BLOOD PRESSURE: 128 MMHG

## 2024-05-29 DIAGNOSIS — R00.2 PALPITATIONS: ICD-10-CM

## 2024-05-29 DIAGNOSIS — R06.09 EXERTIONAL DYSPNEA: Primary | ICD-10-CM

## 2024-05-29 DIAGNOSIS — I10 ESSENTIAL HYPERTENSION: ICD-10-CM

## 2024-05-29 PROCEDURE — 99204 OFFICE O/P NEW MOD 45 MIN: CPT | Performed by: INTERNAL MEDICINE

## 2024-05-29 PROCEDURE — 93000 ELECTROCARDIOGRAM COMPLETE: CPT | Performed by: INTERNAL MEDICINE

## 2024-05-29 RX ORDER — SODIUM CHLORIDE 9 MG/ML
40 INJECTION, SOLUTION INTRAVENOUS AS NEEDED
OUTPATIENT
Start: 2024-05-29

## 2024-05-29 RX ORDER — METOPROLOL TARTRATE 50 MG/1
50 TABLET, FILM COATED ORAL
OUTPATIENT
Start: 2024-05-29

## 2024-05-29 RX ORDER — SODIUM CHLORIDE 0.9 % (FLUSH) 0.9 %
10 SYRINGE (ML) INJECTION AS NEEDED
OUTPATIENT
Start: 2024-05-29

## 2024-05-29 RX ORDER — SODIUM CHLORIDE 0.9 % (FLUSH) 0.9 %
10 SYRINGE (ML) INJECTION EVERY 12 HOURS SCHEDULED
OUTPATIENT
Start: 2024-05-29

## 2024-05-29 RX ORDER — METOPROLOL TARTRATE 50 MG/1
TABLET, FILM COATED ORAL
Qty: 4 TABLET | Refills: 0 | Status: SHIPPED | OUTPATIENT
Start: 2024-05-29

## 2024-05-29 RX ORDER — NITROGLYCERIN 0.4 MG/1
0.4 TABLET SUBLINGUAL
OUTPATIENT
Start: 2024-05-29 | End: 2024-05-29

## 2024-05-29 RX ORDER — NITROGLYCERIN 0.4 MG/1
0.8 TABLET SUBLINGUAL
OUTPATIENT
Start: 2024-05-29

## 2024-05-29 RX ORDER — LIDOCAINE HYDROCHLORIDE 10 MG/ML
0.5 INJECTION, SOLUTION INFILTRATION; PERINEURAL ONCE AS NEEDED
OUTPATIENT
Start: 2024-05-29

## 2024-05-29 RX ORDER — METOPROLOL TARTRATE 1 MG/ML
5 INJECTION, SOLUTION INTRAVENOUS
OUTPATIENT
Start: 2024-05-29

## 2024-05-29 NOTE — PROGRESS NOTES
[facility]      Patient Name: Babatunde Barlow  :1960  Age: 63 y.o.  Encounter Provider:  Yosef Greene Jr, MD      Chief Complaint:   Chief Complaint   Patient presents with    abnormal stress test         HPI  Babatunde Barlow is a 63 y.o. male PMH HTN and HLD presents for evaluation palpitations and exertional dyspnea. Patient has been dealing with sx for the last 6 months with some progression of sx. The palpitations are sometimes associated with chest discomfort. He notes increased MOTLEY and has reduced exercise regimen due to these complaints. He denies orth/pnd or edema. No dizziness or syncope. Cardiac event monitor reviewed with no sustained arrhythmias but isolated PVCs noted with some on the patient triggered events. Nuclear stress study showed irreversible perfusion defect inferior wall with no ischemia. During the treadmill protocol he had 3 mm horizontal ST depressions inferolateral leads. He exercised for 9 minutes with very elevated BP during protocol. No significant FH CAD/SCD. Lifelong nonsmoker with social alcohol intake and denies illicit drug use.      The following portions of the patient's history were reviewed and updated as appropriate: allergies, current medications, past family history, past medical history, past social history, past surgical history and problem list.      Review of Systems   Constitutional: Negative for chills and fever.   HENT:  Negative for hoarse voice and sore throat.    Eyes:  Negative for double vision and photophobia.   Cardiovascular:  Positive for chest pain, dyspnea on exertion and palpitations. Negative for leg swelling, near-syncope, orthopnea, paroxysmal nocturnal dyspnea and syncope.   Respiratory:  Negative for cough and wheezing.    Skin:  Negative for poor wound healing and rash.   Musculoskeletal:  Negative for arthritis and joint swelling.   Gastrointestinal:  Negative for bloating, abdominal pain, hematemesis and  "hematochezia.   Neurological:  Negative for dizziness and focal weakness.   Psychiatric/Behavioral:  Negative for depression and suicidal ideas.      OBJECTIVE:   Vital Signs  Vitals:    05/29/24 1313   BP: 128/78   Pulse: 64     Estimated body mass index is 31.04 kg/m² as calculated from the following:    Height as of this encounter: 179.1 cm (70.5\").    Weight as of this encounter: 99.5 kg (219 lb 6.4 oz).    Vitals reviewed.   Constitutional:       Appearance: Healthy appearance. Not in distress.   Neck:      Vascular: No JVR. JVD normal.   Pulmonary:      Effort: Pulmonary effort is normal.      Breath sounds: Normal breath sounds. No wheezing. No rhonchi. No rales.   Chest:      Chest wall: Not tender to palpatation.   Cardiovascular:      PMI at left midclavicular line. Normal rate. Regular rhythm. Normal S1. Normal S2.       Murmurs: There is no murmur.      No gallop.  No click. No rub.   Pulses:     Intact distal pulses.   Edema:     Peripheral edema absent.   Abdominal:      General: Bowel sounds are normal.      Palpations: Abdomen is soft.      Tenderness: There is no abdominal tenderness.   Musculoskeletal: Normal range of motion.         General: No tenderness. Skin:     General: Skin is warm and dry.   Neurological:      General: No focal deficit present.      Mental Status: Alert and oriented to person, place and time.       ECG 12 Lead    Date/Time: 5/29/2024 1:21 PM  Performed by: Yosef Greene Jr., MD    Authorized by: Yosef Greene Jr., MD  Comparison: compared with previous ECG from 4/3/2024  Similar to previous ECG  Rhythm: sinus rhythm    Clinical impression: normal ECG      Lipid Panel          2/7/2024    15:13   Lipid Panel   Total Cholesterol 187    Triglycerides 178    HDL Cholesterol 45    VLDL Cholesterol 31    LDL Cholesterol  111    LDL/HDL Ratio 2.36         BUN   Date Value Ref Range Status   02/07/2024 28 (H) 8 - 23 mg/dL Final     Creatinine   Date Value Ref Range Status "   02/07/2024 1.37 (H) 0.76 - 1.27 mg/dL Final     Potassium   Date Value Ref Range Status   02/07/2024 4.0 3.5 - 5.2 mmol/L Final     ALT (SGPT)   Date Value Ref Range Status   02/07/2024 26 1 - 41 U/L Final     AST (SGOT)   Date Value Ref Range Status   02/07/2024 31 1 - 40 U/L Final           ASSESSMENT:     62 yo male with chest discomfort, MOTLEY and palpitations      PLAN OF CARE:     CP/MOTLEY - progressive sx with abnormal ECG on stress. Plan for CT coronary angiogram.  Palpitations - sx PVCs. Symptoms are slightly worse. I have recommended apple watch or [employer] alcides  HTN - quite elevated on stress but medications were held for stress. BP log.    RTC 6 months             Discharge Medications            Accurate as of May 29, 2024  1:21 PM. If you have any questions, ask your nurse or doctor.                Continue These Medications        Instructions Start Date   hydroCHLOROthiazide 25 MG tablet   25 mg, Oral, Daily      lisinopril 40 MG tablet  Commonly known as: PRINIVIL,ZESTRIL   40 mg, Oral, Daily      pravastatin 40 MG tablet  Commonly known as: PRAVACHOL   40 mg, Oral, Daily      traZODone 50 MG tablet  Commonly known as: DESYREL   75 mg, Oral, Nightly               Thank you for allowing me to participate in the care of your patient,      Sincerely,   Yosef Greene MD  [facility]  05/29/24  13:21 EDT

## 2024-06-06 ENCOUNTER — OFFICE VISIT (OUTPATIENT)
Dept: FAMILY MEDICINE CLINIC | Facility: CLINIC | Age: 64
End: 2024-06-06
Payer: COMMERCIAL

## 2024-06-06 VITALS
HEIGHT: 71 IN | SYSTOLIC BLOOD PRESSURE: 140 MMHG | DIASTOLIC BLOOD PRESSURE: 90 MMHG | OXYGEN SATURATION: 97 % | TEMPERATURE: 97.8 F | HEART RATE: 67 BPM | WEIGHT: 223 LBS | BODY MASS INDEX: 31.22 KG/M2

## 2024-06-06 DIAGNOSIS — E78.00 PURE HYPERCHOLESTEROLEMIA: ICD-10-CM

## 2024-06-06 DIAGNOSIS — R94.39 ABNORMAL CARDIOVASCULAR STRESS TEST: ICD-10-CM

## 2024-06-06 DIAGNOSIS — I10 PRIMARY HYPERTENSION: Primary | ICD-10-CM

## 2024-06-06 PROCEDURE — 99214 OFFICE O/P EST MOD 30 MIN: CPT | Performed by: FAMILY MEDICINE

## 2024-06-06 NOTE — PROGRESS NOTES
Chief Complaint   Patient presents with    Follow-up     Stress test         Subjective     Babatunde Barlow  has a past medical history of Colon polyp (2018), Edema, Hyperlipidemia, Hypertension, Lumbar radiculopathy (11/29/2018), VERNA on CPAP (12/17/2020), and Seborrheic keratoses (11/25/2019).    Abnormal stress test-his stress test was abnormal.  The nuclear scan showed a small area of abnormal uptake.  He also had some more notable EKG abnormalities.  Since that time he is seeing cardiology.  Cardiology did not feel that this was concerning enough to do a catheterization but is has not scheduled for CT angiogram.  Unfortunately the wait for this is about 3 months.  Since all of his symptoms started with frequent palpitations he has not been very physically active.  Thus he is not really had any exertional chest pain tightness or heaviness.        PHQ-2 Depression Screening  Little interest or pleasure in doing things?     Feeling down, depressed, or hopeless?     PHQ-2 Total Score     PHQ-9 Depression Screening  Little interest or pleasure in doing things?     Feeling down, depressed, or hopeless?     Trouble falling or staying asleep, or sleeping too much?     Feeling tired or having little energy?     Poor appetite or overeating?     Feeling bad about yourself - or that you are a failure or have let yourself or your family down?     Trouble concentrating on things, such as reading the newspaper or watching television?     Moving or speaking so slowly that other people could have noticed? Or the opposite - being so fidgety or restless that you have been moving around a lot more than usual?     Thoughts that you would be better off dead, or of hurting yourself in some way?     PHQ-9 Total Score     If you checked off any problems, how difficult have these problems made it for you to do your work, take care of things at home, or get along with other people?       No Known Allergies    Prior to Admission  medications    Medication Sig Start Date End Date Taking? Authorizing Provider   hydroCHLOROthiazide 25 MG tablet Take 1 tablet by mouth Daily. 12/18/23  Yes Fam Gutierrez DO   lisinopril (PRINIVIL,ZESTRIL) 40 MG tablet Take 1 tablet by mouth Daily. 12/18/23  Yes Fam Gutierrez DO   metoprolol tartrate (LOPRESSOR) 50 MG tablet Measure heart rate and take 1 tablet if heart rate lower than 70, take 2 tablets if heart rate 70 or higher. Do this at bedtime the night before the CT Scan and 1 hour prior to CT Scan appointment 5/29/24  Yes Yosef Greene Jr., MD   pravastatin (PRAVACHOL) 40 MG tablet Take 1 tablet by mouth Daily. 12/18/23  Yes Fam Gutierrez DO   traZODone (DESYREL) 50 MG tablet Take 1.5 tablets by mouth Every Night. 5/7/24  Yes En Noonan APRN        Patient Active Problem List   Diagnosis    Hyperlipidemia    Hypertension    VERNA on CPAP    Annual physical exam    Screening for malignant neoplasm of prostate    Primary insomnia    History of colon polyps    Palpitations    Right foot pain    Abnormal cardiovascular stress test        Past Surgical History:   Procedure Laterality Date    COLONOSCOPY  08/09/2019 2018,2019;SUSY,HISTORY OF COLON POLYPS    COLONOSCOPY N/A 3/31/2023    Procedure: COLONOSCOPY;  Surgeon: Wili Perez MD;  Location: [facility] ENDOSCOPY;  Service: Gastroenterology;  Laterality: N/A;  DIVERTICULOSIS    SHOULDER SURGERY Right     SHOULDER REPAIR       Social History     Socioeconomic History    Marital status:    Tobacco Use    Smoking status: Never    Smokeless tobacco: Never   Vaping Use    Vaping status: Never Used   Substance and Sexual Activity    Alcohol use: Yes     Alcohol/week: 4.0 standard drinks of alcohol     Types: 4 Cans of beer per week     Comment: SOCIALLY    Drug use: Defer    Sexual activity: Defer     Partners: Female     Birth control/protection: Surgical       Family History   Problem Relation Age  "of Onset    Hypertension Mother     Melanoma Mother         nose    Stroke Other     Hypertension Other     Cancer Other         UNSPECIFIED    Diabetes Other     Colon cancer Neg Hx        Family history, surgical history, past medical history, Allergies and meds reviewed with patient today and updated in [facility] EMR.     ROS:  Review of Systems   Constitutional:  Negative for fatigue.   Respiratory:  Negative for cough, chest tightness, shortness of breath and wheezing.    Cardiovascular:  Negative for chest pain and palpitations.       OBJECTIVE:  Vitals:    06/06/24 0821 06/06/24 0847   BP: 155/81 140/90   BP Location: Left arm Left arm   Patient Position: Sitting Sitting   Cuff Size:  Adult   Pulse: 67    Temp: 97.8 °F (36.6 °C)    SpO2: 97%    Weight: 101 kg (223 lb)    Height: 179.1 cm (70.5\")      No results found.   Body mass index is 31.54 kg/m².  No LMP for male patient.    The 10-year ASCVD risk score (Jeramie DYER, et al., 2019) is: 17.3%    Values used to calculate the score:      Age: 63 years      Sex: Male      Is Non- : No      Diabetic: No      Tobacco smoker: No      Systolic Blood Pressure: 140 mmHg      Is BP treated: Yes      HDL Cholesterol: 45 mg/dL      Total Cholesterol: 187 mg/dL     Physical Exam  Vitals and nursing note reviewed.   Constitutional:       General: He is not in acute distress.     Appearance: Normal appearance. He is obese.   HENT:      Head: Normocephalic.   Cardiovascular:      Rate and Rhythm: Normal rate and regular rhythm.      Heart sounds: Normal heart sounds. No murmur heard.  Pulmonary:      Effort: Pulmonary effort is normal.      Breath sounds: Normal breath sounds. No wheezing or rhonchi.   Neurological:      Mental Status: He is alert and oriented to person, place, and time.   Psychiatric:         Mood and Affect: Mood normal.         Thought Content: Thought content normal.         Judgment: Judgment normal.         Procedures    Hospital " Outpatient Visit on 05/23/2024   Component Date Value Ref Range Status    BH CV STRESS PROTOCOL 1 05/23/2024 Foster   Final    Stage 1 05/23/2024 1.0   Final    HR Stage 1 05/23/2024 101   Final    BP Stage 1 05/23/2024 181/81   Final    Duration Min Stage 1 05/23/2024 3   Final    Duration Sec Stage 1 05/23/2024 0   Final    Grade Stage 1 05/23/2024 10   Final    Speed Stage 1 05/23/2024 1.7   Final    BH CV STRESS METS STAGE 1 05/23/2024 5.0   Final    Stage 2 05/23/2024 2.0   Final    HR Stage 2 05/23/2024 128   Final    BP Stage 2 05/23/2024 193/74   Final    Duration Min Stage 2 05/23/2024 3   Final    Duration Sec Stage 2 05/23/2024 0   Final    Grade Stage 2 05/23/2024 12   Final    Speed Stage 2 05/23/2024 2.5   Final    BH CV STRESS METS STAGE 2 05/23/2024 7.5   Final    Stage 3 05/23/2024 3.0   Final    HR Stage 3 05/23/2024 156   Final    BP Stage 3 05/23/2024 192/75   Final    Duration Min Stage 3 05/23/2024 3   Final    Duration Sec Stage 3 05/23/2024 0   Final    Grade Stage 3 05/23/2024 14   Final    Speed Stage 3 05/23/2024 3.4   Final    BH CV STRESS METS STAGE 3 05/23/2024 10.0   Final    Baseline HR 05/23/2024 59  bpm Final    Baseline BP 05/23/2024 160/78  mmHg Final    Peak HR 05/23/2024 156  bpm Final    Peak BP 05/23/2024 210/83  mmHg Final    Recovery HR 05/23/2024 86  bpm Final    Recovery BP 05/23/2024 192/78  mmHg Final    Target HR (85%) 05/23/2024 132  bpm Final    Max. Pred. HR (100%) 05/23/2024 155  bpm Final    Percent Max Pred HR 05/23/2024 100.65  % Final    Exercise duration (min) 05/23/2024 9  min Final    Exercise duration (sec) 05/23/2024 0  sec Final    Estimated workload 05/23/2024 10.2  METS Final    Percent Target HR 05/23/2024 118  % Final    BH CV REST NUCLEAR ISOTOPE DOSE 05/23/2024 10.2  mCi Final    BH CV STRESS NUCLEAR ISOTOPE DOSE 05/23/2024 33.6  mCi Final    Nuc Stress EF 05/23/2024 60  % Final       ASSESSMENT/ PLAN:    Diagnoses and all orders for this visit:    1.  Primary hypertension (Primary)  Assessment & Plan:  His blood pressure is elevated here today.  Will go ahead and recheck it 1 more time.  Upon recheck his blood pressure is improved although little borderline.  Will have him start checking his blood pressure on a weekly basis and keep a log for our next visit.      2. Pure hypercholesterolemia  Assessment & Plan:   His most recent lipid profile showed an LDL of 111.  I would await the results of his CT angiogram.  If this shows significant disease then he will need more aggressive treatment of his lipids to get his LDL less than 55      3. Abnormal cardiovascular stress test  Assessment & Plan:  His cardiologist feels that the probability of any significant disease is low.  He is going to start increasing his physical activity again.  His cardiology wants to know if he has any recurrent symptoms upon doing so.  If not we will wait for his CT angiogram if he starts having recurrent symptoms he is probably getting need to be reevaluated sooner than later.                 Orders Placed Today:     No orders of the defined types were placed in this encounter.       Management Plan:     An After Visit Summary was printed and given to the patient at discharge.    Follow-up: Return in about 3 months (around 9/6/2024) for Recheck.    Fam Gutierrez,  6/6/2024 08:51 EDT  This note was electronically signed.  Answers submitted by the patient for this visit:  Office Visit on 6/6/2024  8:15 AM with Fam Gutierrez  Primary Reason for Visit (Submitted on 5/30/2024)  What is the primary reason for your visit?: Other

## 2024-06-06 NOTE — ASSESSMENT & PLAN NOTE
His cardiologist feels that the probability of any significant disease is low.  He is going to start increasing his physical activity again.  His cardiology wants to know if he has any recurrent symptoms upon doing so.  If not we will wait for his CT angiogram if he starts having recurrent symptoms he is probably getting need to be reevaluated sooner than later.

## 2024-06-06 NOTE — ASSESSMENT & PLAN NOTE
His most recent lipid profile showed an LDL of 111.  I would await the results of his CT angiogram.  If this shows significant disease then he will need more aggressive treatment of his lipids to get his LDL less than 55

## 2024-06-06 NOTE — ASSESSMENT & PLAN NOTE
His blood pressure is elevated here today.  Will go ahead and recheck it 1 more time.  Upon recheck his blood pressure is improved although little borderline.  Will have him start checking his blood pressure on a weekly basis and keep a log for our next visit.

## 2024-08-27 ENCOUNTER — HOSPITAL ENCOUNTER (OUTPATIENT)
Dept: CT IMAGING | Facility: HOSPITAL | Age: 64
Discharge: HOME | End: 2024-08-27
Payer: COMMERCIAL

## 2024-08-27 ENCOUNTER — TELEPHONE (OUTPATIENT)
Dept: CARDIOLOGY | Facility: CLINIC | Age: 64
End: 2024-08-27
Payer: COMMERCIAL

## 2024-08-27 VITALS
OXYGEN SATURATION: 99 % | SYSTOLIC BLOOD PRESSURE: 118 MMHG | HEART RATE: 59 BPM | RESPIRATION RATE: 16 BRPM | DIASTOLIC BLOOD PRESSURE: 71 MMHG

## 2024-08-27 DIAGNOSIS — I10 ESSENTIAL HYPERTENSION: ICD-10-CM

## 2024-08-27 DIAGNOSIS — R06.09 EXERTIONAL DYSPNEA: ICD-10-CM

## 2024-08-27 DIAGNOSIS — R00.2 PALPITATIONS: ICD-10-CM

## 2024-08-27 PROCEDURE — 25510000001 IOPAMIDOL PER 1 ML: Performed by: INTERNAL MEDICINE

## 2024-08-27 PROCEDURE — 75574 CT ANGIO HRT W/3D IMAGE: CPT

## 2024-08-27 RX ORDER — METOPROLOL TARTRATE 25 MG/1
150 TABLET, FILM COATED ORAL ONCE
Status: DISCONTINUED | OUTPATIENT
Start: 2024-08-27 | End: 2024-08-28 | Stop reason: HOSPADM

## 2024-08-27 RX ORDER — METOPROLOL TARTRATE 1 MG/ML
5 INJECTION, SOLUTION INTRAVENOUS
Status: DISCONTINUED | OUTPATIENT
Start: 2024-08-27 | End: 2024-08-28 | Stop reason: HOSPADM

## 2024-08-27 RX ORDER — SODIUM CHLORIDE 0.9 % (FLUSH) 0.9 %
10 SYRINGE (ML) INJECTION AS NEEDED
Status: DISCONTINUED | OUTPATIENT
Start: 2024-08-27 | End: 2024-08-28 | Stop reason: HOSPADM

## 2024-08-27 RX ORDER — IOPAMIDOL 755 MG/ML
95 INJECTION, SOLUTION INTRAVASCULAR
Status: COMPLETED | OUTPATIENT
Start: 2024-08-27 | End: 2024-08-27

## 2024-08-27 RX ORDER — METOPROLOL TARTRATE 25 MG/1
100 TABLET, FILM COATED ORAL ONCE
Status: DISCONTINUED | OUTPATIENT
Start: 2024-08-27 | End: 2024-08-28 | Stop reason: HOSPADM

## 2024-08-27 RX ORDER — ASPIRIN 81 MG/1
81 TABLET ORAL DAILY
Qty: 90 TABLET | Refills: 3 | Status: SHIPPED | OUTPATIENT
Start: 2024-08-27

## 2024-08-27 RX ORDER — SODIUM CHLORIDE 0.9 % (FLUSH) 0.9 %
10 SYRINGE (ML) INJECTION EVERY 12 HOURS SCHEDULED
Status: DISCONTINUED | OUTPATIENT
Start: 2024-08-27 | End: 2024-08-28 | Stop reason: HOSPADM

## 2024-08-27 RX ORDER — SODIUM CHLORIDE 9 MG/ML
40 INJECTION, SOLUTION INTRAVENOUS AS NEEDED
Status: DISCONTINUED | OUTPATIENT
Start: 2024-08-27 | End: 2024-08-28 | Stop reason: HOSPADM

## 2024-08-27 RX ORDER — LIDOCAINE HYDROCHLORIDE 10 MG/ML
0.5 INJECTION, SOLUTION INFILTRATION; PERINEURAL ONCE AS NEEDED
Status: DISCONTINUED | OUTPATIENT
Start: 2024-08-27 | End: 2024-08-28 | Stop reason: HOSPADM

## 2024-08-27 RX ORDER — NITROGLYCERIN 0.4 MG/1
0.4 TABLET SUBLINGUAL
Status: COMPLETED | OUTPATIENT
Start: 2024-08-27 | End: 2024-08-27

## 2024-08-27 RX ORDER — METOPROLOL TARTRATE 25 MG/1
200 TABLET, FILM COATED ORAL ONCE
Status: DISCONTINUED | OUTPATIENT
Start: 2024-08-27 | End: 2024-08-28 | Stop reason: HOSPADM

## 2024-08-27 RX ORDER — METOPROLOL TARTRATE 25 MG/1
50 TABLET, FILM COATED ORAL
Status: DISCONTINUED | OUTPATIENT
Start: 2024-08-27 | End: 2024-08-28 | Stop reason: HOSPADM

## 2024-08-27 RX ORDER — METOPROLOL TARTRATE 25 MG/1
25 TABLET, FILM COATED ORAL ONCE
Status: DISCONTINUED | OUTPATIENT
Start: 2024-08-27 | End: 2024-08-28 | Stop reason: HOSPADM

## 2024-08-27 RX ORDER — NITROGLYCERIN 0.4 MG/1
0.8 TABLET SUBLINGUAL
Status: COMPLETED | OUTPATIENT
Start: 2024-08-27 | End: 2024-08-27

## 2024-08-27 RX ORDER — METOPROLOL TARTRATE 25 MG/1
50 TABLET, FILM COATED ORAL ONCE
Status: DISCONTINUED | OUTPATIENT
Start: 2024-08-27 | End: 2024-08-28 | Stop reason: HOSPADM

## 2024-08-27 RX ADMIN — IOPAMIDOL 95 ML: 755 INJECTION, SOLUTION INTRAVENOUS at 10:45

## 2024-08-27 RX ADMIN — Medication 10 ML: at 10:30

## 2024-08-27 RX ADMIN — NITROGLYCERIN 0.8 MG: 0.4 TABLET SUBLINGUAL at 10:32

## 2024-08-27 NOTE — TELEPHONE ENCOUNTER
Moderate nonobstructive disease on CT coronary angiogram.  Discussed with the patient.  Will add aspirin and follow-up as scheduled.

## 2024-08-27 NOTE — NURSING NOTE
Pt IV removed, Diet soda given and pt tolerated scan well. NAD noted. Pt ambulate self to main entrance with spouse.

## 2024-09-17 ENCOUNTER — OFFICE VISIT (OUTPATIENT)
Dept: FAMILY MEDICINE CLINIC | Facility: CLINIC | Age: 64
End: 2024-09-17
Payer: COMMERCIAL

## 2024-09-17 VITALS
SYSTOLIC BLOOD PRESSURE: 103 MMHG | OXYGEN SATURATION: 98 % | TEMPERATURE: 97 F | HEIGHT: 71 IN | HEART RATE: 69 BPM | WEIGHT: 223 LBS | BODY MASS INDEX: 31.22 KG/M2 | DIASTOLIC BLOOD PRESSURE: 71 MMHG

## 2024-09-17 DIAGNOSIS — E78.00 PURE HYPERCHOLESTEROLEMIA: ICD-10-CM

## 2024-09-17 DIAGNOSIS — I10 PRIMARY HYPERTENSION: Primary | ICD-10-CM

## 2024-09-17 LAB
ALBUMIN SERPL-MCNC: 4.6 G/DL (ref 3.5–5.2)
ALBUMIN/GLOB SERPL: 2.2 G/DL
ALP SERPL-CCNC: 61 U/L (ref 39–117)
ALT SERPL W P-5'-P-CCNC: 34 U/L (ref 1–41)
ANION GAP SERPL CALCULATED.3IONS-SCNC: 11 MMOL/L (ref 5–15)
AST SERPL-CCNC: 32 U/L (ref 1–40)
BILIRUB SERPL-MCNC: 0.7 MG/DL (ref 0–1.2)
BUN SERPL-MCNC: 30 MG/DL (ref 8–23)
BUN/CREAT SERPL: 19.2 (ref 7–25)
CALCIUM SPEC-SCNC: 9.5 MG/DL (ref 8.6–10.5)
CHLORIDE SERPL-SCNC: 106 MMOL/L (ref 98–107)
CHOLEST SERPL-MCNC: 183 MG/DL (ref 0–200)
CO2 SERPL-SCNC: 22 MMOL/L (ref 22–29)
CREAT SERPL-MCNC: 1.56 MG/DL (ref 0.76–1.27)
EGFRCR SERPLBLD CKD-EPI 2021: 48.7 ML/MIN/1.73
GLOBULIN UR ELPH-MCNC: 2.1 GM/DL
GLUCOSE SERPL-MCNC: 86 MG/DL (ref 65–99)
HDLC SERPL-MCNC: 49 MG/DL (ref 40–60)
LDLC SERPL CALC-MCNC: 111 MG/DL (ref 0–100)
LDLC/HDLC SERPL: 2.2 {RATIO}
POTASSIUM SERPL-SCNC: 4.3 MMOL/L (ref 3.5–5.2)
PROT SERPL-MCNC: 6.7 G/DL (ref 6–8.5)
SODIUM SERPL-SCNC: 139 MMOL/L (ref 136–145)
TRIGL SERPL-MCNC: 132 MG/DL (ref 0–150)
VLDLC SERPL-MCNC: 23 MG/DL (ref 5–40)

## 2024-09-17 PROCEDURE — 80053 COMPREHEN METABOLIC PANEL: CPT | Performed by: FAMILY MEDICINE

## 2024-09-17 PROCEDURE — 99213 OFFICE O/P EST LOW 20 MIN: CPT | Performed by: FAMILY MEDICINE

## 2024-09-17 PROCEDURE — 80061 LIPID PANEL: CPT | Performed by: FAMILY MEDICINE

## 2024-09-18 DIAGNOSIS — N28.9 KIDNEY FUNCTION ABNORMAL: Primary | ICD-10-CM

## 2024-09-30 ENCOUNTER — LAB (OUTPATIENT)
Dept: LAB | Facility: HOSPITAL | Age: 64
End: 2024-09-30
Payer: COMMERCIAL

## 2024-09-30 DIAGNOSIS — N28.9 KIDNEY FUNCTION ABNORMAL: ICD-10-CM

## 2024-09-30 LAB
ANION GAP SERPL CALCULATED.3IONS-SCNC: 11 MMOL/L (ref 5–15)
BUN SERPL-MCNC: 23 MG/DL (ref 8–23)
BUN/CREAT SERPL: 19.2 (ref 7–25)
CALCIUM SPEC-SCNC: 9.5 MG/DL (ref 8.6–10.5)
CHLORIDE SERPL-SCNC: 108 MMOL/L (ref 98–107)
CO2 SERPL-SCNC: 23 MMOL/L (ref 22–29)
CREAT SERPL-MCNC: 1.2 MG/DL (ref 0.76–1.27)
EGFRCR SERPLBLD CKD-EPI 2021: 66.7 ML/MIN/1.73
GLUCOSE SERPL-MCNC: 95 MG/DL (ref 65–99)
POTASSIUM SERPL-SCNC: 4.1 MMOL/L (ref 3.5–5.2)
SODIUM SERPL-SCNC: 142 MMOL/L (ref 136–145)

## 2024-09-30 PROCEDURE — 80048 BASIC METABOLIC PNL TOTAL CA: CPT

## 2024-09-30 PROCEDURE — 36415 COLL VENOUS BLD VENIPUNCTURE: CPT

## 2024-12-11 DIAGNOSIS — F51.01 PRIMARY INSOMNIA: ICD-10-CM

## 2024-12-11 RX ORDER — TRAZODONE HYDROCHLORIDE 50 MG/1
75 TABLET, FILM COATED ORAL NIGHTLY
Qty: 180 TABLET | Refills: 1 | Status: SHIPPED | OUTPATIENT
Start: 2024-12-11

## 2024-12-12 RX ORDER — HYDROCHLOROTHIAZIDE 25 MG/1
25 TABLET ORAL DAILY
Qty: 90 TABLET | Refills: 3 | Status: SHIPPED | OUTPATIENT
Start: 2024-12-12

## 2024-12-12 RX ORDER — PRAVASTATIN SODIUM 40 MG
40 TABLET ORAL DAILY
Qty: 90 TABLET | Refills: 3 | Status: SHIPPED | OUTPATIENT
Start: 2024-12-12

## 2024-12-12 RX ORDER — LISINOPRIL 40 MG/1
40 TABLET ORAL DAILY
Qty: 90 TABLET | Refills: 3 | Status: SHIPPED | OUTPATIENT
Start: 2024-12-12

## 2025-03-17 ENCOUNTER — TELEPHONE (OUTPATIENT)
Dept: FAMILY MEDICINE CLINIC | Facility: CLINIC | Age: 65
End: 2025-03-17

## 2025-03-28 ENCOUNTER — OFFICE VISIT (OUTPATIENT)
Dept: FAMILY MEDICINE CLINIC | Facility: CLINIC | Age: 65
End: 2025-03-28
Payer: COMMERCIAL

## 2025-03-28 VITALS
SYSTOLIC BLOOD PRESSURE: 130 MMHG | WEIGHT: 233 LBS | HEIGHT: 71 IN | BODY MASS INDEX: 32.62 KG/M2 | HEART RATE: 70 BPM | DIASTOLIC BLOOD PRESSURE: 89 MMHG | OXYGEN SATURATION: 98 % | TEMPERATURE: 98 F

## 2025-03-28 DIAGNOSIS — Z00.00 ANNUAL PHYSICAL EXAM: ICD-10-CM

## 2025-03-28 DIAGNOSIS — F51.01 PRIMARY INSOMNIA: ICD-10-CM

## 2025-03-28 DIAGNOSIS — I10 PRIMARY HYPERTENSION: Primary | ICD-10-CM

## 2025-03-28 DIAGNOSIS — Z12.5 SCREENING FOR MALIGNANT NEOPLASM OF PROSTATE: ICD-10-CM

## 2025-03-28 DIAGNOSIS — E78.00 PURE HYPERCHOLESTEROLEMIA: ICD-10-CM

## 2025-03-28 RX ORDER — TRAZODONE HYDROCHLORIDE 50 MG/1
100 TABLET ORAL NIGHTLY
Qty: 180 TABLET | Refills: 1 | Status: SHIPPED | OUTPATIENT
Start: 2025-03-28

## 2025-03-28 NOTE — ASSESSMENT & PLAN NOTE
Pap Test  WHY AM I HAVING THIS TEST?  A pap test is sometimes called a pap smear. It is a screening test that is used to check for signs of cancer of the vagina, cervix, and uterus. The test can also identify the presence of infection or precancerous changes. Your health care provider will likely recommend you have this test done on a regular basis. This test may be done:  · Every 3 years, starting at age 21.  · Every 5 years, in combination with testing for the presence of human papillomavirus (HPV).  · More or less often depending on other medical conditions.    WHAT KIND OF SAMPLE IS TAKEN?  Using a small cotton swab, plastic spatula, or brush, your health care provider will collect a sample of cells from the surface of your cervix. Your cervix is the opening to your uterus, also called a womb. Secretions from the cervix and vagina may also be collected.  HOW DO I PREPARE FOR THE TEST?  · Be aware of where you are in your menstrual cycle. You may be asked to reschedule the test if you are menstruating on the day of the test.  · You may need to reschedule if you have a known vaginal infection on the day of the test.  · You may be asked to avoid douching or taking a bath the day before or the day of the test.  · Some medicines can cause abnormal test results, such as digitalis and tetracycline. Talk with your health care provider before your test if you take one of these medicines.  WHAT DO THE RESULTS MEAN?  Abnormal test results may indicate a number of health conditions. These may include:  · Cancer. Although pap test results cannot be used to diagnose cancer of the cervix, vagina, or uterus, they may suggest the possibility of cancer. Further tests would be required to determine if cancer is present.  · Sexually transmitted disease.  · Fungal infection.  · Parasite infection.  · Herpes infection.  · A condition causing or contributing to infertility.  It is your responsibility to obtain your test results. Ask  Visit he overall he is doing fine.  He has had lots of recent life stressors though.  He will continue to work with those.  Will update his routine labs including a PSA.  He will also work and getting an updated Tdap.   the lab or department performing the test when and how you will get your results. Contact your health care provider to discuss any questions you have about your results.     This information is not intended to replace advice given to you by your health care provider. Make sure you discuss any questions you have with your health care provider.     Document Released: 03/09/2004 Document Revised: 01/08/2016 Document Reviewed: 05/11/2015  [employer]vier Interactive Patient Education ©2017 [employer]

## 2025-03-28 NOTE — PROGRESS NOTES
Chief Complaint   Patient presents with    Follow-up     6 month     Hypertension        Subjective     Babatunde Barlow  has a past medical history of Colon polyp (2018), Edema, Hyperlipidemia, Hypertension, Lumbar radiculopathy (11/29/2018), VERNA on CPAP (12/17/2020), and Seborrheic keratoses (11/25/2019).      Annual Checkup  Symptoms are: recurrent.   Onset was 1 to 6 months.   Symptoms occur: intermittently.  Symptoms include: joint pain.   Pertinent negative symptoms include no abdominal pain, no anorexia, no change in stool, no chest pain, no chills, no congestion, no cough, no diaphoresis, no fatigue, no fever, no headaches, no joint swelling, no myalgias, no nausea, no neck pain, no numbness, no rash, no sore throat, no swollen glands, no dysuria, no vertigo, no visual change, no vomiting and no weakness.   Exacerbated by: activity.   Treatments tried include NSAIDs.   Improvement on treatment was significant.   Treatment and/or Medications comments include: Advil   Hypertension  This is a chronic problem. The current episode started more than 1 year ago. The problem has been stable since onset. The problem is controlled. Pertinent negatives include no chest pain, headaches, neck pain or shortness of breath. Risk factors for coronary artery disease include dyslipidemia, male gender and obesity. Current antihypertension treatment includes ACE inhibitors and diuretics. The current treatment provides significant improvement. There are no compliance problems.  There is no history of CAD/MI.   Hyperlipidemia  This is a chronic problem. The current episode started more than 1 year ago. The problem is controlled. Recent lipid tests were reviewed and are normal. Exacerbating diseases include obesity. Pertinent negatives include no chest pain, myalgias or shortness of breath. Current antihyperlipidemic treatment includes statins. The current treatment provides significant improvement of lipids. There are no  compliance problems.  Risk factors for coronary artery disease include dyslipidemia, hypertension, male sex and obesity.       PHQ-2 Depression Screening  Little interest or pleasure in doing things?     Feeling down, depressed, or hopeless?     PHQ-2 Total Score     PHQ-9 Depression Screening  Little interest or pleasure in doing things?     Feeling down, depressed, or hopeless?     Trouble falling or staying asleep, or sleeping too much?     Feeling tired or having little energy?     Poor appetite or overeating?     Feeling bad about yourself - or that you are a failure or have let yourself or your family down?     Trouble concentrating on things, such as reading the newspaper or watching television?     Moving or speaking so slowly that other people could have noticed? Or the opposite - being so fidgety or restless that you have been moving around a lot more than usual?     Thoughts that you would be better off dead, or of hurting yourself in some way?     PHQ-9 Total Score     If you checked off any problems, how difficult have these problems made it for you to do your work, take care of things at home, or get along with other people?       No Known Allergies    Prior to Admission medications    Medication Sig Start Date End Date Taking? Authorizing Provider   aspirin 81 MG EC tablet Take 1 tablet by mouth Daily. 8/27/24  Yes Yosef Greene Jr., MD   hydroCHLOROthiazide 25 MG tablet Take 1 tablet by mouth Daily. 12/12/24  Yes Fam Gutierrez, DO   lisinopril (PRINIVIL,ZESTRIL) 40 MG tablet Take 1 tablet by mouth Daily. 12/12/24  Yes Fam Gutierrez, DO   pravastatin (PRAVACHOL) 40 MG tablet Take 1 tablet by mouth Daily. 12/12/24  Yes Fam Gutierrez, DO   traZODone (DESYREL) 50 MG tablet Take 1.5 tablets by mouth Every Night. 12/11/24  Yes En Noonan APRN        Patient Active Problem List   Diagnosis    Hyperlipidemia    Hypertension    VERNA on CPAP    Annual physical exam     Screening for malignant neoplasm of prostate    Primary insomnia    History of colon polyps    Palpitations    Right foot pain    Abnormal cardiovascular stress test        Past Surgical History:   Procedure Laterality Date    COLONOSCOPY  08/09/2019    2018,2019;SUSY,HISTORY OF COLON POLYPS    COLONOSCOPY N/A 3/31/2023    Procedure: COLONOSCOPY;  Surgeon: Wili Perez MD;  [facility] ENDOSCOPY;  Service: Gastroenterology;  Laterality: N/A;  DIVERTICULOSIS    SHOULDER SURGERY Right     SHOULDER REPAIR       Social History     Socioeconomic History    Marital status:    Tobacco Use    Smoking status: Never    Smokeless tobacco: Never   Vaping Use    Vaping status: Never Used   Substance and Sexual Activity    Alcohol use: Yes     Alcohol/week: 4.0 standard drinks of alcohol     Types: 4 Cans of beer per week     Comment: SOCIALLY    Drug use: Defer    Sexual activity: Defer     Partners: Female     Birth control/protection: Surgical       Family History   Problem Relation Age of Onset    Hypertension Mother     Melanoma Mother         nose    Stroke Other     Hypertension Other     Cancer Other         UNSPECIFIED    Diabetes Other     Colon cancer Neg Hx        Family history, surgical history, past medical history, Allergies and meds reviewed with patient today and updated in [facility].     ROS:  Review of Systems   Constitutional:  Negative for chills, diaphoresis, fatigue and fever.   HENT:  Negative for congestion, sore throat and swollen glands.    Respiratory:  Negative for cough, chest tightness, shortness of breath and wheezing.    Cardiovascular:  Negative for chest pain.   Gastrointestinal:  Negative for abdominal pain, anorexia, nausea and vomiting.   Genitourinary:  Negative for dysuria.   Musculoskeletal:  Positive for joint pain. Negative for myalgias and neck pain.   Skin:  Negative for rash.   Neurological:  Negative for vertigo, weakness, numbness and headache.  "      OBJECTIVE:  Vitals:    03/28/25 1011   BP: 130/89   BP Location: Left arm   Patient Position: Sitting   Pulse: 70   Temp: 98 °F (36.7 °C)   SpO2: 98%   Weight: 106 kg (233 lb)   Height: 179.1 cm (70.5\")     No results found.   Body mass index is 32.96 kg/m².  No LMP for male patient.    The 10-year ASCVD risk score (Jeramie DK, et al., 2019) is: 15.6%    Values used to calculate the score:      Age: 64 years      Sex: Male      Is Non- : No      Diabetic: No      Tobacco smoker: No      Systolic Blood Pressure: 130 mmHg      Is BP treated: Yes      HDL Cholesterol: 49 mg/dL      Total Cholesterol: 183 mg/dL     Physical Exam  Vitals and nursing note reviewed.   Constitutional:       General: He is not in acute distress.     Appearance: Normal appearance. He is normal weight.   HENT:      Head: Normocephalic.      Right Ear: Tympanic membrane, ear canal and external ear normal.      Left Ear: Tympanic membrane, ear canal and external ear normal.      Nose: Nose normal.      Mouth/Throat:      Mouth: Mucous membranes are moist.      Pharynx: Oropharynx is clear.   Eyes:      General: No scleral icterus.     Conjunctiva/sclera: Conjunctivae normal.      Pupils: Pupils are equal, round, and reactive to light.   Cardiovascular:      Rate and Rhythm: Normal rate and regular rhythm.      Pulses: Normal pulses.      Heart sounds: Normal heart sounds. No murmur heard.  Pulmonary:      Effort: Pulmonary effort is normal.      Breath sounds: Normal breath sounds. No wheezing, rhonchi or rales.   Musculoskeletal:      Cervical back: Neck supple. No rigidity or tenderness.   Lymphadenopathy:      Cervical: No cervical adenopathy.   Skin:     General: Skin is warm and dry.      Coloration: Skin is not jaundiced.      Findings: No rash.   Neurological:      General: No focal deficit present.      Mental Status: He is alert and oriented to person, place, and time.   Psychiatric:         Mood and Affect: " Mood normal.         Thought Content: Thought content normal.         Judgment: Judgment normal.         Procedures    No visits with results within 30 Day(s) from this visit.   Latest known visit with results is:   Lab on 09/30/2024   Component Date Value Ref Range Status    Glucose 09/30/2024 95  65 - 99 mg/dL Final    BUN 09/30/2024 23  8 - 23 mg/dL Final    Creatinine 09/30/2024 1.20  0.76 - 1.27 mg/dL Final    Sodium 09/30/2024 142  136 - 145 mmol/L Final    Potassium 09/30/2024 4.1  3.5 - 5.2 mmol/L Final    Chloride 09/30/2024 108 (H)  98 - 107 mmol/L Final    CO2 09/30/2024 23.0  22.0 - 29.0 mmol/L Final    Calcium 09/30/2024 9.5  8.6 - 10.5 mg/dL Final    BUN/Creatinine Ratio 09/30/2024 19.2  7.0 - 25.0 Final    Anion Gap 09/30/2024 11.0  5.0 - 15.0 mmol/L Final    eGFR 09/30/2024 66.7  >60.0 mL/min/1.73 Final       ASSESSMENT/ PLAN:    Diagnoses and all orders for this visit:    1. Primary hypertension (Primary)  Assessment & Plan:  His blood pressure is good here today.  Will continue his current meds and update his labs    Orders:  -     Comprehensive Metabolic Panel  -     CBC & Differential  -     Lipid Panel    2. Pure hypercholesterolemia  Assessment & Plan:   Update his lipid profile with his routine labs.    Orders:  -     Comprehensive Metabolic Panel  -     TSH  -     Lipid Panel    3. Screening for malignant neoplasm of prostate  -     PSA Screen    4. Annual physical exam  Assessment & Plan:  Visit he overall he is doing fine.  He has had lots of recent life stressors though.  He will continue to work with those.  Will update his routine labs including a PSA.  He will also work and getting an updated Tdap.    Orders:  -     Comprehensive Metabolic Panel  -     CBC & Differential  -     TSH  -     Lipid Panel  -     PSA Screen    5. Primary insomnia  -     traZODone (DESYREL) 50 MG tablet; Take 2 tablets by mouth Every Night.  Dispense: 180 tablet; Refill: 1    Other orders  -     Tdap (ADACEL)  5-2-15.5 LF-MCG/0.5 injection; Inject 0.5 mL into the appropriate muscle as directed by prescriber 1 (One) Time for 1 dose.  Dispense: 0.5 mL; Refill: 0               Orders Placed Today:     New Medications Ordered This Visit   Medications    Tdap (ADACEL) 5-2-15.5 LF-MCG/0.5 injection     Sig: Inject 0.5 mL into the appropriate muscle as directed by prescriber 1 (One) Time for 1 dose.     Dispense:  0.5 mL     Refill:  0    traZODone (DESYREL) 50 MG tablet     Sig: Take 2 tablets by mouth Every Night.     Dispense:  180 tablet     Refill:  1        Management Plan:     An After Visit Summary was printed and given to the patient at discharge.    Follow-up: Return in about 1 year (around 3/28/2026) for Annual physical.    Fam Gutierrez DO 3/28/2025 10:43 EDT  This note was electronically signed.

## 2025-04-16 ENCOUNTER — LAB (OUTPATIENT)
Facility: HOSPITAL | Age: 65
End: 2025-04-16
Payer: COMMERCIAL

## 2025-04-16 ENCOUNTER — OFFICE VISIT (OUTPATIENT)
Dept: FAMILY MEDICINE CLINIC | Facility: CLINIC | Age: 65
End: 2025-04-16
Payer: COMMERCIAL

## 2025-04-16 VITALS
BODY MASS INDEX: 32.2 KG/M2 | SYSTOLIC BLOOD PRESSURE: 110 MMHG | OXYGEN SATURATION: 97 % | DIASTOLIC BLOOD PRESSURE: 75 MMHG | HEART RATE: 85 BPM | WEIGHT: 230 LBS | HEIGHT: 71 IN

## 2025-04-16 DIAGNOSIS — E78.00 PURE HYPERCHOLESTEROLEMIA: ICD-10-CM

## 2025-04-16 DIAGNOSIS — I10 PRIMARY HYPERTENSION: ICD-10-CM

## 2025-04-16 DIAGNOSIS — B02.9 HERPES ZOSTER WITHOUT COMPLICATION: Primary | ICD-10-CM

## 2025-04-16 DIAGNOSIS — Z00.00 ANNUAL PHYSICAL EXAM: ICD-10-CM

## 2025-04-16 DIAGNOSIS — Z12.5 SCREENING FOR MALIGNANT NEOPLASM OF PROSTATE: ICD-10-CM

## 2025-04-16 LAB
ALBUMIN SERPL-MCNC: 4.2 G/DL (ref 3.5–5.2)
ALBUMIN/GLOB SERPL: 1.4 G/DL
ALP SERPL-CCNC: 60 U/L (ref 39–117)
ALT SERPL W P-5'-P-CCNC: 42 U/L (ref 1–41)
ANION GAP SERPL CALCULATED.3IONS-SCNC: 11.8 MMOL/L (ref 5–15)
AST SERPL-CCNC: 43 U/L (ref 1–40)
BASOPHILS # BLD AUTO: 0.02 10*3/MM3 (ref 0–0.2)
BASOPHILS NFR BLD AUTO: 0.5 % (ref 0–1.5)
BILIRUB SERPL-MCNC: 0.5 MG/DL (ref 0–1.2)
BUN SERPL-MCNC: 20 MG/DL (ref 8–23)
BUN/CREAT SERPL: 13.8 (ref 7–25)
CALCIUM SPEC-SCNC: 9.6 MG/DL (ref 8.6–10.5)
CHLORIDE SERPL-SCNC: 104 MMOL/L (ref 98–107)
CHOLEST SERPL-MCNC: 212 MG/DL (ref 0–200)
CO2 SERPL-SCNC: 23.2 MMOL/L (ref 22–29)
CREAT SERPL-MCNC: 1.45 MG/DL (ref 0.76–1.27)
DEPRECATED RDW RBC AUTO: 41.6 FL (ref 37–54)
EGFRCR SERPLBLD CKD-EPI 2021: 53.1 ML/MIN/1.73
EOSINOPHIL # BLD AUTO: 0.03 10*3/MM3 (ref 0–0.4)
EOSINOPHIL NFR BLD AUTO: 0.8 % (ref 0.3–6.2)
ERYTHROCYTE [DISTWIDTH] IN BLOOD BY AUTOMATED COUNT: 13.1 % (ref 12.3–15.4)
GLOBULIN UR ELPH-MCNC: 3 GM/DL
GLUCOSE SERPL-MCNC: 114 MG/DL (ref 65–99)
HCT VFR BLD AUTO: 40.2 % (ref 37.5–51)
HDLC SERPL-MCNC: 46 MG/DL (ref 40–60)
HGB BLD-MCNC: 13.6 G/DL (ref 13–17.7)
IMM GRANULOCYTES # BLD AUTO: 0.01 10*3/MM3 (ref 0–0.05)
IMM GRANULOCYTES NFR BLD AUTO: 0.3 % (ref 0–0.5)
LDLC SERPL CALC-MCNC: 123 MG/DL (ref 0–100)
LDLC/HDLC SERPL: 2.53 {RATIO}
LYMPHOCYTES # BLD AUTO: 0.56 10*3/MM3 (ref 0.7–3.1)
LYMPHOCYTES NFR BLD AUTO: 14.9 % (ref 19.6–45.3)
MCH RBC QN AUTO: 29.6 PG (ref 26.6–33)
MCHC RBC AUTO-ENTMCNC: 33.8 G/DL (ref 31.5–35.7)
MCV RBC AUTO: 87.6 FL (ref 79–97)
MONOCYTES # BLD AUTO: 0.31 10*3/MM3 (ref 0.1–0.9)
MONOCYTES NFR BLD AUTO: 8.2 % (ref 5–12)
NEUTROPHILS NFR BLD AUTO: 2.83 10*3/MM3 (ref 1.7–7)
NEUTROPHILS NFR BLD AUTO: 75.3 % (ref 42.7–76)
NRBC BLD AUTO-RTO: 0 /100 WBC (ref 0–0.2)
PLATELET # BLD AUTO: 142 10*3/MM3 (ref 140–450)
PMV BLD AUTO: 9.6 FL (ref 6–12)
POTASSIUM SERPL-SCNC: 4.7 MMOL/L (ref 3.5–5.2)
PROT SERPL-MCNC: 7.2 G/DL (ref 6–8.5)
PSA SERPL-MCNC: 4.34 NG/ML (ref 0–4)
RBC # BLD AUTO: 4.59 10*6/MM3 (ref 4.14–5.8)
SODIUM SERPL-SCNC: 139 MMOL/L (ref 136–145)
TRIGL SERPL-MCNC: 247 MG/DL (ref 0–150)
TSH SERPL DL<=0.05 MIU/L-ACNC: 1.48 UIU/ML (ref 0.27–4.2)
VLDLC SERPL-MCNC: 43 MG/DL (ref 5–40)
WBC NRBC COR # BLD AUTO: 3.76 10*3/MM3 (ref 3.4–10.8)

## 2025-04-16 PROCEDURE — 36415 COLL VENOUS BLD VENIPUNCTURE: CPT

## 2025-04-16 PROCEDURE — 80061 LIPID PANEL: CPT

## 2025-04-16 PROCEDURE — 80050 GENERAL HEALTH PANEL: CPT

## 2025-04-16 PROCEDURE — 99214 OFFICE O/P EST MOD 30 MIN: CPT | Performed by: STUDENT IN AN ORGANIZED HEALTH CARE EDUCATION/TRAINING PROGRAM

## 2025-04-16 PROCEDURE — G0103 PSA SCREENING: HCPCS

## 2025-04-16 RX ORDER — VALACYCLOVIR HYDROCHLORIDE 1 G/1
1000 TABLET, FILM COATED ORAL 3 TIMES DAILY
Qty: 21 TABLET | Refills: 0 | Status: SHIPPED | OUTPATIENT
Start: 2025-04-16 | End: 2025-04-23

## 2025-04-16 NOTE — PROGRESS NOTES
"Chief Complaint  Rash (Burning on right shoulder )    Subjective      Babatunde Barlow is a 64 y.o. male who presents to [facility]     History of Present Illness  The patient presents for evaluation of a rash.    He reports the onset of a rash yesterday, which was preceded by pain. He has no prior history of shingles.         Patient Care Team:  Fam Gutierrez DO as PCP - General (Family Medicine)    Review of Systems      Objective   Vital Signs:   Vitals:    04/16/25 1551   BP: 110/75   BP Location: Left arm   Patient Position: Sitting   Cuff Size: Adult   Pulse: 85   SpO2: 97%   Weight: 104 kg (230 lb)   Height: 179.1 cm (70.51\")     Body mass index is 32.52 kg/m².    Wt Readings from Last 3 Encounters:   04/16/25 104 kg (230 lb)   03/28/25 106 kg (233 lb)   09/17/24 101 kg (223 lb)     BP Readings from Last 3 Encounters:   04/16/25 110/75   03/28/25 130/89   09/17/24 103/71       Health Maintenance   Topic Date Due    TDAP/TD VACCINES (1 - Tdap) Never done    ZOSTER VACCINE (1 of 2) Never done    COVID-19 Vaccine (9 - 2024-25 season) 09/01/2024    INFLUENZA VACCINE  07/01/2025    LIPID PANEL  09/17/2025    ANNUAL PHYSICAL  03/28/2026    COLORECTAL CANCER SCREENING  03/31/2028    HEPATITIS C SCREENING  Completed    Pneumococcal Vaccine 50+  Completed       Physical Exam  Constitutional:       Appearance: Normal appearance.   HENT:      Head: Normocephalic and atraumatic.   Skin:     Findings: Rash present.   Neurological:      General: No focal deficit present.      Mental Status: He is alert and oriented to person, place, and time.   Psychiatric:         Mood and Affect: Mood normal.         Behavior: Behavior normal.         Physical Exam  Rash is present on the skin.       Result Review   The following data was reviewed by: Sachi Milligan MD on 04/16/2025:  [x]  Tests & Results  []  Hospitalization/Emergency Department/Urgent Care  []  Internal/External " Consultant Notes      Results         ASSESSMENT/PLAN  Diagnoses and all orders for this visit:    1. Herpes zoster without complication (Primary)  -     valACYclovir (VALTREX) 1000 MG tablet; Take 1 tablet by mouth 3 (Three) Times a Day for 7 days.  Dispense: 21 tablet; Refill: 0        Assessment & Plan  1. Rash.  The clinical presentation of the rash, which started yesterday and was preceded by pain, is consistent with shingles. He has not had shingles before and has not received the shingles vaccine. A picture of the rash was taken for documentation.       Babatudne Barlow  reports that he has never smoked. He has never used smokeless tobacco. I  FOLLOW UP  Return if symptoms worsen or fail to improve.  Patient was given instructions and counseling regarding his condition or for health maintenance advice. Please see specific information pulled into the AVS if appropriate.       Sachi Milligan MD  04/16/25  16:19 EDT    Patient or patient representative verbalized consent for the use of Ambient Listening during the visit with  Sachi Milligan MD for chart documentation. 4/16/2025  16:19 EDT

## 2025-04-17 DIAGNOSIS — N28.9 KIDNEY FUNCTION ABNORMAL: Primary | ICD-10-CM

## 2025-04-17 DIAGNOSIS — R97.20 ELEVATED PSA: Primary | ICD-10-CM

## 2025-04-17 RX ORDER — ATORVASTATIN CALCIUM 40 MG/1
40 TABLET, FILM COATED ORAL DAILY
Qty: 90 TABLET | Refills: 1 | Status: SHIPPED | OUTPATIENT
Start: 2025-04-17

## 2025-05-01 DIAGNOSIS — B02.9 HERPES ZOSTER WITHOUT COMPLICATION: ICD-10-CM

## 2025-05-01 RX ORDER — VALACYCLOVIR HYDROCHLORIDE 1 G/1
1000 TABLET, FILM COATED ORAL 3 TIMES DAILY
Qty: 21 TABLET | Refills: 0 | Status: SHIPPED | OUTPATIENT
Start: 2025-05-01 | End: 2025-05-08

## 2025-05-09 NOTE — PROGRESS NOTES
Chief Complaint: Elevated PSA and new patient    Patient or patient representative verbalized consent for the use of Ambient Listening during the visit with  SEFERINO Rodriguez for chart documentation. 5/13/2025  09:24 EDT    Subjective         History of Present Illness  Babatunde Barlow is a 64 y.o. male presents to [facility] to be seen for elevated PSA.    The patient was found to have an elevated PSA on routine blood work completed on 4/16/2025 of 4.34.  This was a significant change from his previous PSA in April 2024 of 1.03.  He reports that he has a history of elevated prostate-specific antigen (PSA) levels previously, which were previously managed with a watchful waiting approach. He reports no changes in urinary symptoms, including frequency, urgency, incontinence, or leakage. He does not experience dysuria or pain in the perineal region. His urinary stream remains strong, and he has not observed any hematuria. He has no history of nephrolithiasis or urological surgeries. He is due for repeat blood work with his primary care physician and plans to schedule it at the end of the week.     He struggles with sleep and was prescribed trazodone by his PCP to help him rest.    Supplemental Information  He has a history of hypertension and palpitations but reports no history of diabetes, asthma, COPD, or sleep apnea. He is currently on baby aspirin.    SOCIAL HISTORY  He does not use tobacco.    FAMILY HISTORY  There is no family history of prostate cancer, bladder cancer, or kidney cancer.    MEDICATIONS  Current: trazodone, baby aspirin    Urinary symptoms/history:   Frequency-denies     Urgency-denies     Incontinence-denies     Nocturia-admits, 1 X per night on average     Dysuria-denies     Perineal pain-denies     Stream-normal/good/strong     GH-denies     History of stones-denies      surgeries-denies     Family history of  malignancy-denies     Cardiopulmonary-HTN,  palpitations    Anticoagulants-ASA 81mg     Smoker-denies     PSA  4/16/2025 4.34  4/2/2024 1.03  12/20/2022 1.03    Objective     Past Medical History:   Diagnosis Date    Abnormal ECG 5/23    Nuclear Stress Test    Arthritis     Colon polyp 2018    Edema     Elevated PSA April 2025    4.3    Hyperlipidemia     Hypertension     Lumbar radiculopathy 11/29/2018    VERNA on CPAP 12/17/2020    F/U WITH SLEEP CLINIC    Seborrheic keratoses 11/25/2019       Past Surgical History:   Procedure Laterality Date    ADENOIDECTOMY      COLONOSCOPY  08/09/2019 2018,2019;SUSY,HISTORY OF COLON POLYPS    COLONOSCOPY N/A 03/31/2023    Procedure: COLONOSCOPY;  Surgeon: Wili Perez MD;  Location: [facility] ENDOSCOPY;  Service: Gastroenterology;  Laterality: N/A;  DIVERTICULOSIS    SHOULDER SURGERY Right     SHOULDER REPAIR    TONSILLECTOMY      VASECTOMY           Current Outpatient Medications:     aspirin 81 MG EC tablet, Take 1 tablet by mouth Daily., Disp: 90 tablet, Rfl: 3    atorvastatin (Lipitor) 40 MG tablet, Take 1 tablet by mouth Daily., Disp: 90 tablet, Rfl: 1    hydroCHLOROthiazide 25 MG tablet, Take 1 tablet by mouth Daily., Disp: 90 tablet, Rfl: 3    lisinopril (PRINIVIL,ZESTRIL) 40 MG tablet, Take 1 tablet by mouth Daily., Disp: 90 tablet, Rfl: 3    traZODone (DESYREL) 50 MG tablet, Take 2 tablets by mouth Every Night., Disp: 180 tablet, Rfl: 1    No Known Allergies     Family History   Problem Relation Age of Onset    Hypertension Mother     Melanoma Mother         nose    Arthritis Mother     Hyperlipidemia Mother     Stroke Other     Hypertension Other     Cancer Other         UNSPECIFIED    Diabetes Other     Colon cancer Neg Hx        Social History     Socioeconomic History    Marital status:    Tobacco Use    Smoking status: Never    Smokeless tobacco: Never   Vaping Use    Vaping status: Never Used   Substance and Sexual Activity    Alcohol use: Yes     Alcohol/week: 6.0 standard drinks of  "alcohol     Types: 4 Cans of beer, 2 Shots of liquor per week     Comment: SOCIALLY    Drug use: Never    Sexual activity: Yes     Partners: Female     Birth control/protection: None, Vasectomy       Vital Signs:   Resp 12   Ht 179.1 cm (70.51\")   Wt 107 kg (235 lb)   BMI 33.23 kg/m²      Physical Exam  Vitals and nursing note reviewed.   Constitutional:       General: He is not in acute distress.     Appearance: Normal appearance. He is not toxic-appearing.   Pulmonary:      Effort: Pulmonary effort is normal. No respiratory distress.   Neurological:      General: No focal deficit present.      Mental Status: He is alert and oriented to person, place, and time.          Result Review :   The following data was reviewed by: SEFERINO Rodriguez on 05/13/2025:  Results for orders placed or performed in visit on 05/13/25   Bladder Scan    Collection Time: 05/13/25  8:53 AM   Result Value Ref Range    Urine Volume 0       PSA          4/16/2025    07:51   PSA   PSA 4.340    Bladder Scan interpretation 05/13/2025    Estimation of residual urine via BVI 3000 [address] Bladder Scan  MA/nurse performing: SMILEY Garcia  Residual Urine: 0 ml  Indication: Elevated prostate specific antigen (PSA)   Position: Supine  Examination: Incremental scanning of the suprapubic area using 2.0 MHz transducer using copious amounts of acoustic gel.   Findings: An anechoic area was demonstrated which represented the bladder, with measurement of residual urine as noted. I inspected this myself. In that the residual urine was stable or insignificant, refer to plan for treatment and plan necessary at this time.         Results  Laboratory Studies  PSA level was 4.34 on 04/16/2025. Last year, PSA was 1.03.        Procedures        Assessment and Plan    Diagnoses and all orders for this visit:    1. Elevated prostate specific antigen (PSA) (Primary)  -     Bladder Scan  -     PSA DIAGNOSTIC; Future        Assessment & Plan  1. Elevated " prostate-specific antigen (PSA) levels.  His PSA levels have shown a significant increase from 1.03 last year to 4.34 on 04/16/2025. Despite this, he reports no changes in urinary symptoms such as increased frequency, urgency, incontinence, or nocturia. He also reports no pain or burning during urination, no pain in the prostate area, and no visible blood in the urine. Differential diagnoses include prostate malignancy, infection, inflammation, or trauma. A repeat PSA test will be conducted later this week, coinciding with his regular lab work. He has been advised to abstain from sexual intercourse, bike riding, and strenuous activities that could potentially elevate his PSA levels until the repeat test is completed. The results will be communicated to him promptly upon receipt. If the repeat PSA remains elevated, further options such as a course of antibiotics or an MRI of the prostate will be considered, including possible treatment with a 4-week course of antibiotics with repeat PSA to follow and/or MRI of the prostate.      Follow Up   No follow-ups on file.  Patient was given instructions and counseling regarding his condition or for health maintenance advice. Please see specific information pulled into the AVS if appropriate.         This document has been electronically signed by SEFERINO Rodriguez  May 13, 2025 09:32 EDT

## 2025-05-13 ENCOUNTER — OFFICE VISIT (OUTPATIENT)
Dept: UROLOGY | Age: 65
End: 2025-05-13
Payer: COMMERCIAL

## 2025-05-13 VITALS — BODY MASS INDEX: 32.9 KG/M2 | HEIGHT: 71 IN | RESPIRATION RATE: 12 BRPM | WEIGHT: 235 LBS

## 2025-05-13 DIAGNOSIS — R97.20 ELEVATED PROSTATE SPECIFIC ANTIGEN (PSA): Primary | ICD-10-CM

## 2025-05-13 LAB — URINE VOLUME: 0

## 2025-05-13 PROCEDURE — 99203 OFFICE O/P NEW LOW 30 MIN: CPT | Performed by: NURSE PRACTITIONER

## 2025-05-16 ENCOUNTER — LAB (OUTPATIENT)
Facility: HOSPITAL | Age: 65
End: 2025-05-16
Payer: COMMERCIAL

## 2025-05-16 DIAGNOSIS — R97.20 ELEVATED PROSTATE SPECIFIC ANTIGEN (PSA): ICD-10-CM

## 2025-05-16 DIAGNOSIS — N28.9 KIDNEY FUNCTION ABNORMAL: ICD-10-CM

## 2025-05-16 LAB
ALBUMIN SERPL-MCNC: 4.8 G/DL (ref 3.5–5.2)
ALBUMIN/GLOB SERPL: 2 G/DL
ALP SERPL-CCNC: 57 U/L (ref 39–117)
ALT SERPL W P-5'-P-CCNC: 47 U/L (ref 1–41)
ANION GAP SERPL CALCULATED.3IONS-SCNC: 13.7 MMOL/L (ref 5–15)
AST SERPL-CCNC: 49 U/L (ref 1–40)
BILIRUB SERPL-MCNC: 0.7 MG/DL (ref 0–1.2)
BUN SERPL-MCNC: 29 MG/DL (ref 8–23)
BUN/CREAT SERPL: 18.6 (ref 7–25)
CALCIUM SPEC-SCNC: 10 MG/DL (ref 8.6–10.5)
CHLORIDE SERPL-SCNC: 100 MMOL/L (ref 98–107)
CO2 SERPL-SCNC: 23.3 MMOL/L (ref 22–29)
CREAT SERPL-MCNC: 1.56 MG/DL (ref 0.76–1.27)
EGFRCR SERPLBLD CKD-EPI 2021: 48.7 ML/MIN/1.73
GLOBULIN UR ELPH-MCNC: 2.4 GM/DL
GLUCOSE SERPL-MCNC: 107 MG/DL (ref 65–99)
POTASSIUM SERPL-SCNC: 4.1 MMOL/L (ref 3.5–5.2)
PROT SERPL-MCNC: 7.2 G/DL (ref 6–8.5)
PSA SERPL-MCNC: 1.31 NG/ML (ref 0–4)
SODIUM SERPL-SCNC: 137 MMOL/L (ref 136–145)

## 2025-05-16 PROCEDURE — 84153 ASSAY OF PSA TOTAL: CPT

## 2025-05-16 PROCEDURE — 80053 COMPREHEN METABOLIC PANEL: CPT

## 2025-05-16 PROCEDURE — 36415 COLL VENOUS BLD VENIPUNCTURE: CPT

## 2025-05-19 ENCOUNTER — RESULTS FOLLOW-UP (OUTPATIENT)
Dept: UROLOGY | Age: 65
End: 2025-05-19
Payer: COMMERCIAL

## 2025-05-19 DIAGNOSIS — R97.20 ELEVATED PROSTATE SPECIFIC ANTIGEN (PSA): Primary | ICD-10-CM

## 2025-05-19 DIAGNOSIS — N28.9 KIDNEY FUNCTION ABNORMAL: Primary | ICD-10-CM

## 2025-05-19 NOTE — TELEPHONE ENCOUNTER
Called patient, reviewed PSA results.  His PSA has returned back to normal which is great news.  We did discuss that it is possible that his elevated PSA was due to a temporary infection, inflammation or irritation of the prostate.  I did recommend watching his PSA a little closer since he did have the elevation previously.  We are going to recheck his PSA in 6 months around the middle or end of November.  I did offer the patient a follow-up appointment around that time, but he would like to just have his PSA done and see what that shows to determine if he needs to follow-up or not.  I think that this is a reasonable option.  The patient was advised to contact me if he has any concerns at any time or needs to be seen as needed.  He verbalizes understanding and agrees with plan of care.

## 2025-06-13 NOTE — PROGRESS NOTES
RM:________     PCP: Fam Gutierrez, DO                                     Last EKG :  2024    : 1960  AGE: 64 y.o.    REASON FOR VISIT: __________________________________________    ____________________________________________________________                                                       Wt Readings from Last 3 Encounters:   25 107 kg (235 lb)   25 104 kg (230 lb)   25 106 kg (233 lb)      BP Readings from Last 3 Encounters:   25 110/75   25 130/89   24 103/71          WT: ____________ HT: ______ BP: __________ HR ______   02% _______                 Lipid Panel          2024    12:32 2025    07:51   Lipid Panel   Total Cholesterol 183  212    Triglycerides 132  247    HDL Cholesterol 49  46    VLDL Cholesterol 23  43    LDL Cholesterol  111  123    LDL/HDL Ratio 2.20  2.53

## 2025-06-17 ENCOUNTER — OFFICE VISIT (OUTPATIENT)
Dept: CARDIOLOGY | Age: 65
End: 2025-06-17
Payer: COMMERCIAL

## 2025-06-17 VITALS
HEIGHT: 71 IN | DIASTOLIC BLOOD PRESSURE: 64 MMHG | OXYGEN SATURATION: 97 % | BODY MASS INDEX: 33.4 KG/M2 | HEART RATE: 60 BPM | SYSTOLIC BLOOD PRESSURE: 110 MMHG | WEIGHT: 238.6 LBS

## 2025-06-17 DIAGNOSIS — G47.33 OSA ON CPAP: Primary | ICD-10-CM

## 2025-06-17 DIAGNOSIS — R00.2 PALPITATIONS: ICD-10-CM

## 2025-06-17 DIAGNOSIS — I10 PRIMARY HYPERTENSION: ICD-10-CM

## 2025-06-17 DIAGNOSIS — R06.09 EXERTIONAL DYSPNEA: ICD-10-CM

## 2025-06-17 PROCEDURE — 93000 ELECTROCARDIOGRAM COMPLETE: CPT | Performed by: NURSE PRACTITIONER

## 2025-06-17 PROCEDURE — 99214 OFFICE O/P EST MOD 30 MIN: CPT | Performed by: NURSE PRACTITIONER

## 2025-06-17 RX ORDER — METOPROLOL SUCCINATE 25 MG/1
12.5 TABLET, EXTENDED RELEASE ORAL EVERY EVENING
Qty: 45 TABLET | Refills: 3 | Status: SHIPPED | OUTPATIENT
Start: 2025-06-17

## 2025-06-17 NOTE — ASSESSMENT & PLAN NOTE
Patient with intermittent episodes of chest discomfort as well as exertional dyspnea with strenuous activities.  Patient has had a coronary CTA as well as a myocardial perfusion stress test   Coronary CTA revealed nonobstructive CAD but calcified RCA lesion with 25% stenosis and a noncalcified LAD plaque with 50% stenosis.  Will get echocardiogram to further assess patient's symptoms.  I had metoprolol succinate 12.5 mg/day

## 2025-06-17 NOTE — PROGRESS NOTES
CARDIOLOGY        Patient Name: Babatunde Barlow  :1960  Age: 64 y.o.  Primary Cardiologist: Yosef Greene MD  Encounter Provider:  SEFERINO Kevni    Date of Service: 2025      CHIEF COMPLAINT / REASON FOR OFFICE VISIT     Follow-up (Abnormal rhythm strip)        HPI  Babatunde Barlow is a 64 y.o. male who presents today for abnormal rhythm strip follow-up.     Pt has a  history significant for HTN, HLD, exertional dyspnea.    Patient notes that since the last assessment, he had done well until the end of the year, he felt that palpitations were secondary to stress, which seemed to improve.  He notes that in January, his son was diagnosed with metastatic melanoma with mets to the brain.  He has been traveling to [place] to assist with caring with his son.  He notes that he had cancelled an appointment with us as a result of the travel.  He does have an apple iWatch and he has been monitoring his rhythm, he has noted some increased palpitations and has some bigeminy.  He is also experiencing chest pressure intermittently.  He does admit that chest pressure occurs with stress.  He is exercising.  He does not experience chest pressure with strenuous activities.      HISTORY OF PRESENT ILLNESS     Myocardial perfusion stress test 2024.  Small sized infarct in the basal inferior lateral wall with no ischemia.  3 mm flat ST depression at peak exercise.  Recommend cardiology evaluation.    Patient was last evaluated in clinic 2024.  At that time he was dealing with exertional shortness of breath with exertion with at times associated chest discomfort.  Patient was arranged for a CTA coronary.    CTA coronary 2024.  Mild two-vessel CAD.  Calcified proximal RCA causing 25% stenosis.  Noncalcified mid LAD plaque with 50% stenosis.  Normal LV systolic function.  Recommend ongoing cardiovascular risk factor optimization.  Aspirin was added to her regimen.    The following portions of the  "patient's history were reviewed and updated as appropriate: allergies, current medications, past family history, past medical history, past social history, past surgical history and problem list.      VITAL SIGNS     Visit Vitals  /64 (BP Location: Left arm, Patient Position: Sitting, Cuff Size: Adult)   Pulse 60   Ht 179.1 cm (70.51\")   Wt 108 kg (238 lb 9.6 oz)   SpO2 97%   BMI 33.74 kg/m²         Wt Readings from Last 3 Encounters:   06/17/25 108 kg (238 lb 9.6 oz)   05/13/25 107 kg (235 lb)   04/16/25 104 kg (230 lb)     Body mass index is 33.74 kg/m².      REVIEW OF SYSTEMS     ROS        PHYSICAL EXAMINATION     Physical Exam      REVIEWED DATA       ECG 12 Lead    Date/Time: 6/17/2025 11:04 AM  Performed by: Leilani Warner APRN    Authorized by: Leilani Warner APRN  Comparison: compared with previous ECG from 5/29/2024  Rhythm: sinus rhythm  Rate: normal  BPM: 60  Conduction: conduction normal  ST Segments: ST segments normal  T Waves: T waves normal  QRS axis: normal    Clinical impression: normal ECG              Lipid Panel          9/17/2024    12:32 4/16/2025    07:51   Lipid Panel   Total Cholesterol 183  212    Triglycerides 132  247    HDL Cholesterol 49  46    VLDL Cholesterol 23  43    LDL Cholesterol  111  123    LDL/HDL Ratio 2.20  2.53        Lab Results   Component Value Date     05/16/2025     04/16/2025    K 4.1 05/16/2025    K 4.7 04/16/2025     05/16/2025     04/16/2025    CO2 23.3 05/16/2025    CO2 23.2 04/16/2025    BUN 29 (H) 05/16/2025    BUN 20 04/16/2025    CREATININE 1.56 (H) 05/16/2025    CREATININE 1.45 (H) 04/16/2025    EGFRIFNONA 63 12/20/2021    GLUCOSE 107 (H) 05/16/2025    GLUCOSE 114 (H) 04/16/2025    CALCIUM 10.0 05/16/2025    CALCIUM 9.6 04/16/2025    ALBUMIN 4.8 05/16/2025    ALBUMIN 4.2 04/16/2025    BILITOT 0.7 05/16/2025    BILITOT 0.5 04/16/2025    AST 49 (H) 05/16/2025    AST 43 (H) 04/16/2025    ALT 47 (H) 05/16/2025    ALT 42 (H) " "04/16/2025     Lab Results   Component Value Date    WBC 3.76 04/16/2025    WBC 7.04 04/02/2024    HGB 13.6 04/16/2025    HGB 13.2 04/02/2024    HCT 40.2 04/16/2025    HCT 38.7 04/02/2024    MCV 87.6 04/16/2025    MCV 87.2 04/02/2024     04/16/2025     04/02/2024     No results found for: \"PROBNP\", \"BNP\"  No results found for: \"CKTOTAL\", \"CKMB\", \"CKMBINDEX\", \"TROPONINI\", \"TROPONINT\"  Lab Results   Component Value Date    TSH 1.480 04/16/2025    TSH 1.670 04/02/2024             ASSESSMENT & PLAN     Diagnoses and all orders for this visit:    1. VERNA on CPAP (Primary)  Overview:  F/U WITH SLEEP CLINIC      2. Primary hypertension    3. Palpitations  -     Adult Transthoracic Echo Complete W/ Cont if Necessary Per Protocol; Future    4. Exertional dyspnea  -     Adult Transthoracic Echo Complete W/ Cont if Necessary Per Protocol; Future    Other orders  -     metoprolol succinate XL (TOPROL-XL) 25 MG 24 hr tablet; Take 0.5 tablets by mouth Every Evening.  Dispense: 45 tablet; Refill: 3  -     ECG 12 Lead        No follow-ups on file.    Future Appointments         Provider Department Center    6/26/2025 2:45 PM (Arrive by 2:15 PM) [facility] ECHO/VAS BACK [facility] OUTPATIENT ECHOCARDIOGRAPHY [facility]    4/2/2026 9:00 AM (Arrive by 8:45 AM) Fam Gutierrez DO [facility] [place] FAMILY MEDICINE [facility]              MEDICATIONS         Discharge Medications            Accurate as of June 17, 2025  4:18 PM. If you have any questions, ask your nurse or doctor.                New Medications        Instructions Start Date   metoprolol succinate XL 25 MG 24 hr tablet  Commonly known as: TOPROL-XL  Started by: SEFERINO Gutierrez   12.5 mg, Oral, Every Evening             Continue These Medications        Instructions Start Date   aspirin 81 MG EC tablet   81 mg, Oral, Daily      atorvastatin 40 MG tablet  Commonly known as: Lipitor   40 mg, Oral, Daily      lisinopril 40 MG " tablet  Commonly known as: PRINIVIL,ZESTRIL   40 mg, Oral, Daily      traZODone 50 MG tablet  Commonly known as: DESYREL   100 mg, Oral, Nightly             Stop These Medications      hydroCHLOROthiazide 25 MG tablet  Stopped by: SEFERINO Gutierrez                  **Dragon Disclaimer:   Much of this encounter note is an electronic transcription/translation of spoken language to printed text. The electronic translation of spoken language may permit erroneous, or at times, nonsensical words or phrases to be inadvertently transcribed. Although I have reviewed the note for such errors, some may still exist.      No

## 2025-06-17 NOTE — ASSESSMENT & PLAN NOTE
Patient have been occurring with times of stress.  Patient is extremely stressed as his son has metastatic melanoma and is being treated in [place].  Will get echocardiogram  Discussed risk and benefits of adding metoprolol succinate 12.5 mg/day.  Patient would like to trial this to see if that improves his symptoms.

## 2025-06-26 ENCOUNTER — HOSPITAL ENCOUNTER (OUTPATIENT)
Dept: CARDIOLOGY | Facility: HOSPITAL | Age: 65
Discharge: HOME | End: 2025-06-26
Admitting: NURSE PRACTITIONER
Payer: COMMERCIAL

## 2025-06-26 VITALS
HEIGHT: 70 IN | SYSTOLIC BLOOD PRESSURE: 160 MMHG | DIASTOLIC BLOOD PRESSURE: 82 MMHG | BODY MASS INDEX: 34.07 KG/M2 | HEART RATE: 55 BPM | WEIGHT: 238 LBS

## 2025-06-26 DIAGNOSIS — R00.2 PALPITATIONS: ICD-10-CM

## 2025-06-26 DIAGNOSIS — R06.09 EXERTIONAL DYSPNEA: ICD-10-CM

## 2025-06-26 LAB
AORTIC DIMENSIONLESS INDEX: 1 (DI)
ASCENDING AORTA: 3.4 CM
AV MEAN PRESS GRAD SYS DOP V1V2: 3.1 MMHG
AV VMAX SYS DOP: 131.2 CM/SEC
BH CV ECHO MEAS - ACS: 2.09 CM
BH CV ECHO MEAS - AI P1/2T: 1233 MSEC
BH CV ECHO MEAS - AO MAX PG: 6.9 MMHG
BH CV ECHO MEAS - AO ROOT AREA (BSA CORRECTED): 1.5 CM2
BH CV ECHO MEAS - AO ROOT DIAM: 3.4 CM
BH CV ECHO MEAS - AO V2 VTI: 27 CM
BH CV ECHO MEAS - AVA(I,D): 3.3 CM2
BH CV ECHO MEAS - EDV(CUBED): 170.9 ML
BH CV ECHO MEAS - EDV(MOD-SP2): 180 ML
BH CV ECHO MEAS - EDV(MOD-SP4): 164 ML
BH CV ECHO MEAS - EF(MOD-SP2): 63.3 %
BH CV ECHO MEAS - EF(MOD-SP4): 58.5 %
BH CV ECHO MEAS - ESV(MOD-SP2): 66 ML
BH CV ECHO MEAS - ESV(MOD-SP4): 68 ML
BH CV ECHO MEAS - FS: 30.9 %
BH CV ECHO MEAS - IVS/LVPW: 0.99 CM
BH CV ECHO MEAS - IVSD: 0.8 CM
BH CV ECHO MEAS - LAT PEAK E' VEL: 11.9 CM/SEC
BH CV ECHO MEAS - LV DIASTOLIC VOL/BSA (35-75): 73 CM2
BH CV ECHO MEAS - LV MASS(C)D: 163.9 GRAMS
BH CV ECHO MEAS - LV MAX PG: 6.8 MMHG
BH CV ECHO MEAS - LV MEAN PG: 3 MMHG
BH CV ECHO MEAS - LV SYSTOLIC VOL/BSA (12-30): 30.3 CM2
BH CV ECHO MEAS - LV V1 MAX: 130 CM/SEC
BH CV ECHO MEAS - LV V1 VTI: 27.1 CM
BH CV ECHO MEAS - LVIDD: 5.5 CM
BH CV ECHO MEAS - LVIDS: 3.8 CM
BH CV ECHO MEAS - LVOT AREA: 3.3 CM2
BH CV ECHO MEAS - LVOT DIAM: 2.04 CM
BH CV ECHO MEAS - LVPWD: 0.81 CM
BH CV ECHO MEAS - MED PEAK E' VEL: 9.7 CM/SEC
BH CV ECHO MEAS - MR MAX PG: 133.5 MMHG
BH CV ECHO MEAS - MR MAX VEL: 577.7 CM/SEC
BH CV ECHO MEAS - MV A DUR: 0.14 SEC
BH CV ECHO MEAS - MV A MAX VEL: 88.1 CM/SEC
BH CV ECHO MEAS - MV DEC SLOPE: 475.5 CM/SEC2
BH CV ECHO MEAS - MV DEC TIME: 0.2 SEC
BH CV ECHO MEAS - MV E MAX VEL: 90.6 CM/SEC
BH CV ECHO MEAS - MV E/A: 1.03
BH CV ECHO MEAS - MV MAX PG: 3.9 MMHG
BH CV ECHO MEAS - MV MEAN PG: 1.22 MMHG
BH CV ECHO MEAS - MV P1/2T: 55.1 MSEC
BH CV ECHO MEAS - MV V2 VTI: 32.3 CM
BH CV ECHO MEAS - MVA(P1/2T): 4 CM2
BH CV ECHO MEAS - MVA(VTI): 2.7 CM2
BH CV ECHO MEAS - PA V2 MAX: 93.5 CM/SEC
BH CV ECHO MEAS - PI END-D VEL: 135.7 CM/SEC
BH CV ECHO MEAS - PULM A REVS DUR: 0.16 SEC
BH CV ECHO MEAS - PULM A REVS VEL: 30.6 CM/SEC
BH CV ECHO MEAS - PULM DIAS VEL: 37.9 CM/SEC
BH CV ECHO MEAS - PULM S/D: 1.34
BH CV ECHO MEAS - PULM SYS VEL: 51 CM/SEC
BH CV ECHO MEAS - RAP SYSTOLE: 3 MMHG
BH CV ECHO MEAS - RVOT DIAM: 2.21 CM
BH CV ECHO MEAS - RVSP: 29 MMHG
BH CV ECHO MEAS - SV(LVOT): 88.7 ML
BH CV ECHO MEAS - SV(MOD-SP2): 114 ML
BH CV ECHO MEAS - SV(MOD-SP4): 96 ML
BH CV ECHO MEAS - SVI(LVOT): 39.4 ML/M2
BH CV ECHO MEAS - SVI(MOD-SP2): 50.7 ML/M2
BH CV ECHO MEAS - SVI(MOD-SP4): 42.7 ML/M2
BH CV ECHO MEAS - TAPSE (>1.6): 4 CM
BH CV ECHO MEAS - TR MAX PG: 26.3 MMHG
BH CV ECHO MEAS - TR MAX VEL: 256.4 CM/SEC
BH CV ECHO MEASUREMENTS AVERAGE E/E' RATIO: 8.39
BH CV XLRA - RV BASE: 3.3 CM
BH CV XLRA - RV LENGTH: 6.8 CM
BH CV XLRA - RV MID: 2.5 CM
BH CV XLRA - TDI S': 16.4 CM/SEC
LEFT ATRIUM VOLUME INDEX: 20.4 ML/M2
LV EF BIPLANE MOD: 61.4 %
SINUS: 3.6 CM
STJ: 3 CM

## 2025-06-26 PROCEDURE — 93306 TTE W/DOPPLER COMPLETE: CPT

## 2025-06-26 PROCEDURE — 25510000001 PERFLUTREN 6.52 MG/ML SUSPENSION 2 ML VIAL: Performed by: NURSE PRACTITIONER

## 2025-06-26 RX ADMIN — PERFLUTREN 1 ML: 6.52 INJECTION, SUSPENSION INTRAVENOUS at 15:01

## 2025-07-21 ENCOUNTER — TRANSCRIBE ORDERS (OUTPATIENT)
Dept: ADMINISTRATIVE | Facility: HOSPITAL | Age: 65
End: 2025-07-21
Payer: COMMERCIAL

## 2025-07-21 DIAGNOSIS — N18.30 STAGE 3 CHRONIC KIDNEY DISEASE, UNSPECIFIED WHETHER STAGE 3A OR 3B CKD: Primary | ICD-10-CM

## 2025-07-29 ENCOUNTER — HOSPITAL ENCOUNTER (OUTPATIENT)
Dept: ULTRASOUND IMAGING | Facility: HOSPITAL | Age: 65
Discharge: HOME | End: 2025-07-29
Admitting: INTERNAL MEDICINE
Payer: COMMERCIAL

## 2025-07-29 DIAGNOSIS — N18.30 STAGE 3 CHRONIC KIDNEY DISEASE, UNSPECIFIED WHETHER STAGE 3A OR 3B CKD: ICD-10-CM

## 2025-07-29 PROCEDURE — 76775 US EXAM ABDO BACK WALL LIM: CPT

## 2025-08-06 ENCOUNTER — OFFICE VISIT (OUTPATIENT)
Dept: CARDIOLOGY | Age: 65
End: 2025-08-06
Payer: COMMERCIAL

## 2025-08-06 VITALS
WEIGHT: 228 LBS | HEIGHT: 70 IN | OXYGEN SATURATION: 98 % | BODY MASS INDEX: 32.64 KG/M2 | DIASTOLIC BLOOD PRESSURE: 90 MMHG | SYSTOLIC BLOOD PRESSURE: 180 MMHG | HEART RATE: 64 BPM

## 2025-08-06 DIAGNOSIS — G47.33 OSA ON CPAP: ICD-10-CM

## 2025-08-06 DIAGNOSIS — I10 PRIMARY HYPERTENSION: Primary | ICD-10-CM

## 2025-08-06 PROCEDURE — 99213 OFFICE O/P EST LOW 20 MIN: CPT | Performed by: NURSE PRACTITIONER

## 2025-08-06 RX ORDER — AMLODIPINE BESYLATE 2.5 MG/1
2.5 TABLET ORAL DAILY
Qty: 30 TABLET | Refills: 11 | Status: SHIPPED | OUTPATIENT
Start: 2025-08-06

## (undated) DEVICE — CONN JET HYDRA H20 AUXILIARY DISP

## (undated) DEVICE — SOL IRRG H2O PL/BG 1000ML STRL

## (undated) DEVICE — Device

## (undated) DEVICE — LINER SURG CANSTR SXN S/RIGD 1500CC

## (undated) DEVICE — Device: Brand: DEFENDO AIR/WATER/SUCTION AND BIOPSY VALVE

## (undated) DEVICE — SOLIDIFIER LIQLOC PLS 1500CC BT